# Patient Record
Sex: MALE | Race: WHITE | NOT HISPANIC OR LATINO | URBAN - METROPOLITAN AREA
[De-identification: names, ages, dates, MRNs, and addresses within clinical notes are randomized per-mention and may not be internally consistent; named-entity substitution may affect disease eponyms.]

---

## 2017-12-06 ENCOUNTER — EMERGENCY (EMERGENCY)
Facility: HOSPITAL | Age: 67
LOS: 1 days | Discharge: HOME | End: 2017-12-06
Attending: STUDENT IN AN ORGANIZED HEALTH CARE EDUCATION/TRAINING PROGRAM | Admitting: STUDENT IN AN ORGANIZED HEALTH CARE EDUCATION/TRAINING PROGRAM
Payer: COMMERCIAL

## 2017-12-06 VITALS
RESPIRATION RATE: 16 BRPM | TEMPERATURE: 97 F | HEART RATE: 77 BPM | SYSTOLIC BLOOD PRESSURE: 146 MMHG | HEIGHT: 65 IN | WEIGHT: 138.01 LBS | DIASTOLIC BLOOD PRESSURE: 77 MMHG | OXYGEN SATURATION: 98 %

## 2017-12-06 DIAGNOSIS — Z98.890 OTHER SPECIFIED POSTPROCEDURAL STATES: Chronic | ICD-10-CM

## 2017-12-06 PROCEDURE — 99284 EMERGENCY DEPT VISIT MOD MDM: CPT | Mod: 25

## 2017-12-06 RX ORDER — EZETIMIBE AND SIMVASTATIN 10; 80 MG/1; MG/1
1 TABLET, FILM COATED ORAL
Qty: 0 | Refills: 0 | COMMUNITY

## 2017-12-06 RX ORDER — DOXEPIN HCL 100 MG
1 CAPSULE ORAL
Qty: 0 | Refills: 0 | COMMUNITY

## 2017-12-06 RX ORDER — OMEPRAZOLE 10 MG/1
2 CAPSULE, DELAYED RELEASE ORAL
Qty: 0 | Refills: 0 | COMMUNITY

## 2017-12-06 RX ORDER — PRASUGREL 5 MG/1
0 TABLET, FILM COATED ORAL
Qty: 0 | Refills: 0 | COMMUNITY

## 2017-12-06 RX ORDER — TADALAFIL 10 MG/1
1 TABLET, FILM COATED ORAL
Qty: 0 | Refills: 0 | COMMUNITY

## 2017-12-06 RX ORDER — ASPIRIN/CALCIUM CARB/MAGNESIUM 324 MG
1 TABLET ORAL
Qty: 0 | Refills: 0 | COMMUNITY

## 2017-12-06 RX ORDER — DIAZEPAM 5 MG
1 TABLET ORAL
Qty: 0 | Refills: 0 | COMMUNITY

## 2017-12-06 NOTE — ED ADULT NURSE NOTE - OBJECTIVE STATEMENT
Received and assumed care of pt at this time aaox3 in no acute distress. pt is a 66 y/o m sent from urgent care to r/o testicular torsion, c/o testicular & groin pain x 2-3 hours. denies fever, chills, nausea, vomiting or any other distress. seen and evaluated by dr. butler. urine collected & sent, pending US, will continue to monitor.

## 2017-12-06 NOTE — ED ADULT TRIAGE NOTE - CHIEF COMPLAINT QUOTE
sent from urgent care for r/o testicular torsion..complaints of testicular and groin pain for a 2-3 hours, sudden onset. patient reports swelling and redness to testes

## 2017-12-07 VITALS
RESPIRATION RATE: 18 BRPM | TEMPERATURE: 98 F | SYSTOLIC BLOOD PRESSURE: 136 MMHG | OXYGEN SATURATION: 96 % | DIASTOLIC BLOOD PRESSURE: 72 MMHG | HEART RATE: 71 BPM

## 2017-12-07 LAB
APPEARANCE UR: CLEAR — SIGNIFICANT CHANGE UP
BILIRUB UR-MCNC: NEGATIVE — SIGNIFICANT CHANGE UP
COLOR SPEC: YELLOW — SIGNIFICANT CHANGE UP
DIFF PNL FLD: NEGATIVE — SIGNIFICANT CHANGE UP
GLUCOSE UR QL: NEGATIVE — SIGNIFICANT CHANGE UP
KETONES UR-MCNC: NEGATIVE — SIGNIFICANT CHANGE UP
LEUKOCYTE ESTERASE UR-ACNC: NEGATIVE — SIGNIFICANT CHANGE UP
NITRITE UR-MCNC: NEGATIVE — SIGNIFICANT CHANGE UP
PH UR: 5 — SIGNIFICANT CHANGE UP (ref 5–8)
PROT UR-MCNC: NEGATIVE — SIGNIFICANT CHANGE UP
SP GR SPEC: 1.02 — SIGNIFICANT CHANGE UP (ref 1.01–1.02)
UROBILINOGEN FLD QL: NEGATIVE — SIGNIFICANT CHANGE UP

## 2017-12-07 PROCEDURE — 76870 US EXAM SCROTUM: CPT | Mod: 26

## 2017-12-07 PROCEDURE — 87591 N.GONORRHOEAE DNA AMP PROB: CPT

## 2017-12-07 PROCEDURE — 87086 URINE CULTURE/COLONY COUNT: CPT

## 2017-12-07 PROCEDURE — 99284 EMERGENCY DEPT VISIT MOD MDM: CPT | Mod: 25

## 2017-12-07 PROCEDURE — 76870 US EXAM SCROTUM: CPT

## 2017-12-07 PROCEDURE — 81001 URINALYSIS AUTO W/SCOPE: CPT

## 2017-12-07 RX ADMIN — Medication 100 MILLIGRAM(S): at 01:43

## 2017-12-07 NOTE — ED PROVIDER NOTE - OBJECTIVE STATEMENT
67 year old male with a history of MI, CHF, HTN presents with sudden onset right testicular pain that occurred while having intercourse.  He went to  and was advised to come to ER to r/o testicular torsion.  Patient states he had similar pain in his left testicle this summer and was diagnosed with epididymitis.  Was placed on doxy and tested negative for GC/chlamydia.  States he cannot tolerate flouroquinolones.  PMD Tao Mercado

## 2017-12-08 LAB
CULTURE RESULTS: NO GROWTH — SIGNIFICANT CHANGE UP
N GONORRHOEA RRNA SPEC QL NAA+PROBE: SIGNIFICANT CHANGE UP
SPECIMEN SOURCE: SIGNIFICANT CHANGE UP
SPECIMEN SOURCE: SIGNIFICANT CHANGE UP

## 2021-09-17 PROBLEM — I21.9 ACUTE MYOCARDIAL INFARCTION, UNSPECIFIED: Chronic | Status: ACTIVE | Noted: 2017-12-06

## 2021-09-17 PROBLEM — Z95.5 PRESENCE OF CORONARY ANGIOPLASTY IMPLANT AND GRAFT: Chronic | Status: ACTIVE | Noted: 2017-12-06

## 2021-10-12 ENCOUNTER — APPOINTMENT (OUTPATIENT)
Dept: SURGERY | Facility: CLINIC | Age: 71
End: 2021-10-12
Payer: MEDICARE

## 2021-10-12 DIAGNOSIS — K40.91 UNILATERAL INGUINAL HERNIA, W/OUT OBSTRUCTION OR GANGRENE, RECURRENT: ICD-10-CM

## 2021-10-12 DIAGNOSIS — N50.3 CYST OF EPIDIDYMIS: ICD-10-CM

## 2021-10-12 PROCEDURE — 99205 OFFICE O/P NEW HI 60 MIN: CPT

## 2021-10-13 PROBLEM — N50.3 EPIDIDYMAL CYST: Status: ACTIVE | Noted: 2021-10-13

## 2021-10-13 PROBLEM — K40.91 RECURRENT LEFT INGUINAL HERNIA: Status: ACTIVE | Noted: 2021-10-13

## 2021-10-13 NOTE — ASSESSMENT
[FreeTextEntry1] : Presents with left groin pain.  Findings demonstrate recurrent left inguinal hernia.  Previous hernia repair by Dr. Shannon at [facility] many years ago.\par \par Also complains of left testicular pain.  Small focal nodule present at the apex of the testicle suspicious for epididymal cyst.  Recommend urologic follow-up.  Seen previously on testicular sono.\par \par Pt very inquisitive yet skeptical of what he's being told.  Anxious.\par \par Recommend a Laparoscopic  approach as this is is a recurrence and repeat anterior dissection can be difficult given previous scarring.  Risk, Benefits, and Alternatives to surgery have been discussed.  This includes but is not limited to bleeding, infection, damage to adjacent structures, need for additional surgery or interventions, adverse effects of anesthesia such as cardio-respiratory complications, prolonged intubation, cardiac arrhythmia, arrest, and or death.  Risks of forgoing surgery have also been discussed including progression of, and/or worsening of current condition which may then require urgent or emergent treatment or surgery.\par Educational material courtesy of the [employer] with respect to inguinal and femoral hernias has been provided for the patient's review and all questions have been answered.\par \par Recommend urologic follow up for left testicular pain and suspected epididymal cyst.\par \par The patient doesn’t seem convinced about what i've discussed with him and may not move forward with surgery at this time.\par \par Signs and symptoms of incarceration/strangulation/obstruction have been reviewed with the patient.  Should such symptoms present, urgent medical attention should be sought.  Contact my office immediately and or head to your nearest emergency room for evaluation and treatment.  This may require immediate surgical repair.\par

## 2021-10-13 NOTE — PHYSICAL EXAM
[Normal Breath Sounds] : Normal breath sounds [Normal Heart Sounds] : normal heart sounds [Normal Rate and Rhythm] : normal rate and rhythm [No Rash or Lesion] : No rash or lesion [Alert] : alert [Oriented to Person] : oriented to person [Oriented to Place] : oriented to place [Oriented to Time] : oriented to time [Anxious] : anxious [de-identified] : Elderly appearing gentleman appears anxious. [de-identified] : GIGI MURRAY EOMI [de-identified] : Soft, nontender nondistended, positive bowel sounds in all four quads.  Well healed and faint scars in bilateral groins.  Right side secondary to hernia repair as an infant.  Left groin from previous hernia repair several years ago.  Recurrent left inguinal hernia superior/medial.  Soft and easily reducible. No rebound or guarding.\par  [de-identified] : 2-3 mm well circumsribed nodule on left epididymis.  Slightly tender to palpation.  Corresponds to that which was previously seen on imaging. [de-identified] : Ambulating without difficulty or assistance

## 2021-11-29 ENCOUNTER — APPOINTMENT (OUTPATIENT)
Dept: SURGERY | Facility: HOSPITAL | Age: 71
End: 2021-11-29

## 2022-07-14 ENCOUNTER — APPOINTMENT (OUTPATIENT)
Dept: ULTRASOUND IMAGING | Facility: CLINIC | Age: 72
End: 2022-07-14

## 2022-07-14 PROCEDURE — 76700 US EXAM ABDOM COMPLETE: CPT

## 2022-07-14 PROCEDURE — 76857 US EXAM PELVIC LIMITED: CPT

## 2022-07-14 PROCEDURE — 76870 US EXAM SCROTUM: CPT

## 2022-07-19 ENCOUNTER — APPOINTMENT (OUTPATIENT)
Dept: ORTHOPEDIC SURGERY | Facility: CLINIC | Age: 72
End: 2022-07-19

## 2022-07-19 VITALS — BODY MASS INDEX: 22.49 KG/M2 | WEIGHT: 135 LBS | HEIGHT: 65 IN

## 2022-07-19 DIAGNOSIS — S60.022A CONTUSION OF LEFT INDEX FINGER W/OUT DAMAGE TO NAIL, INITIAL ENCOUNTER: ICD-10-CM

## 2022-07-19 PROCEDURE — 73140 X-RAY EXAM OF FINGER(S): CPT | Mod: LT

## 2022-07-19 PROCEDURE — 99213 OFFICE O/P EST LOW 20 MIN: CPT

## 2022-07-19 NOTE — ASSESSMENT
[FreeTextEntry1] : The patient was advised of the diagnosis. The natural history of the pathology was explained in full to the patient in layman's terms. All questions were answered. The risks and benefits of surgical and non-surgical treatment alternatives were explained in full to the patient.\par \par NSAIDs recommended.  Patient warned of risk of NSAID medication to stomach and GI tract, risk of increase blood pressure, cardiac risk, and risk of fluid retention.  The patient should clear taking medication with internist/PMD if any problem with heart, blood pressure, or GI system exists. \par \par ice

## 2022-07-19 NOTE — IMAGING
[de-identified] : left IF- no deformity- +swelling dip\par TTP about the DIP\par good flexion and extension\par NVID [Left] : left fingers [FreeTextEntry9] : left index finger - no fractures dislocations

## 2022-11-10 ENCOUNTER — OUTPATIENT (OUTPATIENT)
Dept: OUTPATIENT SERVICES | Facility: HOSPITAL | Age: 72
LOS: 1 days | End: 2022-11-10
Payer: MEDICARE

## 2022-11-10 DIAGNOSIS — B57.5: ICD-10-CM

## 2022-11-10 DIAGNOSIS — Z98.890 OTHER SPECIFIED POSTPROCEDURAL STATES: Chronic | ICD-10-CM
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
## 2022-11-10 DIAGNOSIS — R10.0 ACUTE ABDOMEN: ICD-10-CM
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
## 2022-11-11 PROCEDURE — 87207 SMEAR SPECIAL STAIN: CPT

## 2022-11-11 PROCEDURE — 36415 COLL VENOUS BLD VENIPUNCTURE: CPT

## 2022-11-11 PROCEDURE — 86753 PROTOZOA ANTIBODY NOS: CPT

## 2023-02-01 ENCOUNTER — APPOINTMENT (OUTPATIENT)
Dept: ULTRASOUND IMAGING | Facility: CLINIC | Age: 73
End: 2023-02-01
Payer: MEDICARE

## 2023-02-01 PROCEDURE — 93971 EXTREMITY STUDY: CPT | Mod: RT

## 2023-03-02 ENCOUNTER — APPOINTMENT (OUTPATIENT)
Dept: ORTHOPEDIC SURGERY | Facility: CLINIC | Age: 73
End: 2023-03-02
Payer: MEDICARE

## 2023-03-02 VITALS — BODY MASS INDEX: 22.49 KG/M2 | HEIGHT: 65 IN | WEIGHT: 135 LBS

## 2023-03-02 DIAGNOSIS — M79.18 MYALGIA, OTHER SITE: ICD-10-CM

## 2023-03-02 PROCEDURE — 99214 OFFICE O/P EST MOD 30 MIN: CPT | Mod: 25

## 2023-03-02 PROCEDURE — 20610 DRAIN/INJ JOINT/BURSA W/O US: CPT

## 2023-03-02 PROCEDURE — J3490M: CUSTOM | Mod: NC

## 2023-03-02 PROCEDURE — 73564 X-RAY EXAM KNEE 4 OR MORE: CPT | Mod: RT

## 2023-03-02 NOTE — IMAGING

## 2023-03-02 NOTE — HISTORY OF PRESENT ILLNESS
[de-identified] : 72 year old male  (retired)  chronic rt knee pain worsening since since 2/27/23 with no known injury. \par The pain is located  anterior\par The pain is associated with aching, popping, loose. \par Worse with activity and better at rest.\par Has tried ibuprofen, cream

## 2023-03-02 NOTE — ASSESSMENT
[FreeTextEntry1] : Right X-Ray Examination of the KNEE (4 views): medial and patellofemoral degenerate changes.\par \par chronic arthritis with exac along with prob mm root tearing\par \par - We discussed their diagnosis and treatment options at length including the risks and benefits of both surgical treatment with a knee replacement and non-surgical options.\par - We will continue conservative treatment with activity modification, PT, icing, weight loss, and anti-inflammatory medications.\par - The patient was provided with a PT prescription to work on ROM, hip ER/abductors strengthening, quad/hamstring stretches and strengthening, and other exercises \par - The patient was advised to let pain guide the gradual advancement of activities. \par - We also discussed the possible of a corticosteroid injection in order to help decrease inflammation and pain so that they can perform better therapy.\par - The risks, benefits, and alternatives to corticosteroid injection were reviewed with the patient and they wished to proceed with this treatment course. \par - Follow up as needed in 6 weeks to re-evaluate, if no improvement we spoke about possibility of viscosupplementation injections\par

## 2023-03-03 ENCOUNTER — APPOINTMENT (OUTPATIENT)
Dept: ORTHOPEDIC SURGERY | Facility: CLINIC | Age: 73
End: 2023-03-03
Payer: MEDICARE

## 2023-03-03 VITALS — WEIGHT: 135 LBS | HEIGHT: 65 IN | BODY MASS INDEX: 22.49 KG/M2

## 2023-03-03 DIAGNOSIS — M17.11 UNILATERAL PRIMARY OSTEOARTHRITIS, RIGHT KNEE: ICD-10-CM

## 2023-03-03 PROCEDURE — 99213 OFFICE O/P EST LOW 20 MIN: CPT

## 2023-03-03 NOTE — HISTORY OF PRESENT ILLNESS
[7] : 7 [4] : 4 [Dull/Aching] : dull/aching [Intermittent] : intermittent [Nothing helps with pain getting better] : Nothing helps with pain getting better [de-identified] : 72 year old male  (retired)  chronic rt knee pain worsening since since 2/27/23 with no known injury. \par The pain is located  anterior\par The pain is associated with aching, popping, loose. \par Worse with activity and better at rest.\par Has tried ibuprofen, cream [] : no

## 2023-03-03 NOTE — DISCUSSION/SUMMARY
[de-identified] : I explained to him the cortisone takes 3-10 days to start to work. Ice, ibuprofen, ROM and quad exercises, can start PT

## 2023-03-07 ENCOUNTER — OFFICE (OUTPATIENT)
Dept: URBAN - METROPOLITAN AREA CLINIC 109 | Facility: CLINIC | Age: 73
Setting detail: OPHTHALMOLOGY
End: 2023-03-07
Payer: MEDICARE

## 2023-03-07 DIAGNOSIS — H10.45: ICD-10-CM

## 2023-03-07 PROCEDURE — 99213 OFFICE O/P EST LOW 20 MIN: CPT | Performed by: OPHTHALMOLOGY

## 2023-03-07 ASSESSMENT — LID POSITION - DERMATOCHALASIS
OS_DERMATOCHALASIS: 1+
OD_DERMATOCHALASIS: 1+

## 2023-03-07 ASSESSMENT — REFRACTION_CURRENTRX
OS_ADD: +2.50
OS_AXIS: 173
OS_OVR_VA: 20/
OS_AXIS: 166
OS_OVR_VA: 20/
OD_VPRISM_DIRECTION: PROGS
OS_SPHERE: -8.00
OS_CYLINDER: -1.50
OD_SPHERE: -8.25
OS_SPHERE: -8.50
OS_CYLINDER: -2.00
OD_VPRISM_DIRECTION: PROGS
OD_ADD: +3.00
OS_CYLINDER: -1.25
OS_ADD: +3.00
OD_SPHERE: -7.00
OD_AXIS: 012
OD_OVR_VA: 20/
OS_ADD: +2.75
OD_CYLINDER: -1.00
OD_OVR_VA: 20/
OD_ADD: +2.50
OS_AXIS: 170
OD_CYLINDER: -0.25
OD_SPHERE: -8.00
OS_OVR_VA: 20/
OD_CYLINDER: -1.75
OD_OVR_VA: 20/
OD_AXIS: 10
OS_SPHERE: -7.25
OD_AXIS: 17
OD_ADD: +2.75
OS_VPRISM_DIRECTION: PROGS

## 2023-03-07 ASSESSMENT — REFRACTION_MANIFEST
OS_CYLINDER: -1.50
OS_ADD: +2.75
OD_AXIS: 15
OS_SPHERE: -6.75
OD_CYLINDER: -0.25
OD_SPHERE: -7.25
OD_VA1: 20/20-
OS_AXIS: 170
OS_VA1: 20/20-
OD_ADD: +2.75

## 2023-03-07 ASSESSMENT — SPHEQUIV_DERIVED
OS_SPHEQUIV: -7.5
OS_SPHEQUIV: -7.25
OD_SPHEQUIV: -7.375

## 2023-03-07 ASSESSMENT — KERATOMETRY
OD_AXISANGLE_DEGREES: 095
OS_AXISANGLE_DEGREES: 093
OD_K1POWER_DIOPTERS: 45.75
OS_K2POWER_DIOPTERS: 46.75
OD_K2POWER_DIOPTERS: 46.50
OS_K1POWER_DIOPTERS: 45.75

## 2023-03-07 ASSESSMENT — LID EXAM ASSESSMENTS
OS_BLEPHARITIS: LLL LUL 1+
OD_BLEPHARITIS: RLL RUL 1+

## 2023-03-07 ASSESSMENT — REFRACTION_AUTOREFRACTION
OS_CYLINDER: -1.50
OS_SPHERE: -6.50
OS_AXIS: 176

## 2023-03-07 ASSESSMENT — AXIALLENGTH_DERIVED
OS_AL: 25.5576
OD_AL: 25.6692
OS_AL: 25.6725

## 2023-03-07 ASSESSMENT — VISUAL ACUITY
OD_BCVA: 20/30
OS_BCVA: 20/25+2

## 2023-03-07 ASSESSMENT — CONFRONTATIONAL VISUAL FIELD TEST (CVF)
OD_FINDINGS: FULL
OS_FINDINGS: FULL

## 2023-03-07 ASSESSMENT — TONOMETRY: OD_IOP_MMHG: 16

## 2023-03-18 ENCOUNTER — OFFICE (OUTPATIENT)
Dept: URBAN - METROPOLITAN AREA CLINIC 109 | Facility: CLINIC | Age: 73
Setting detail: OPHTHALMOLOGY
End: 2023-03-18
Payer: MEDICARE

## 2023-03-18 DIAGNOSIS — H16.223: ICD-10-CM

## 2023-03-18 DIAGNOSIS — H02.89: ICD-10-CM

## 2023-03-18 DIAGNOSIS — H40.013: ICD-10-CM

## 2023-03-18 DIAGNOSIS — H10.45: ICD-10-CM

## 2023-03-18 PROCEDURE — 99213 OFFICE O/P EST LOW 20 MIN: CPT | Performed by: OPHTHALMOLOGY

## 2023-03-18 ASSESSMENT — SUPERFICIAL PUNCTATE KERATITIS (SPK)
OD_SPK: 1+
OS_SPK: 1+

## 2023-03-18 ASSESSMENT — REFRACTION_CURRENTRX
OS_CYLINDER: -2.00
OS_ADD: +2.75
OD_ADD: +2.75
OD_CYLINDER: -1.75
OD_AXIS: 012
OS_SPHERE: -8.50
OD_SPHERE: -8.00
OD_OVR_VA: 20/
OS_VPRISM_DIRECTION: PROGS
OD_VPRISM_DIRECTION: PROGS
OD_ADD: +3.00
OD_ADD: +2.50
OD_SPHERE: -8.25
OS_ADD: +2.50
OD_CYLINDER: -0.25
OS_OVR_VA: 20/
OD_OVR_VA: 20/
OS_OVR_VA: 20/
OD_VPRISM_DIRECTION: PROGS
OS_ADD: +3.00
OD_OVR_VA: 20/
OD_CYLINDER: -1.00
OD_AXIS: 10
OS_OVR_VA: 20/
OS_SPHERE: -8.00
OD_AXIS: 17
OS_CYLINDER: -1.50
OS_SPHERE: -7.25
OS_CYLINDER: -1.25
OS_AXIS: 173
OS_AXIS: 166
OD_SPHERE: -7.00
OS_AXIS: 170

## 2023-03-18 ASSESSMENT — VISUAL ACUITY
OD_BCVA: 20/20-1
OS_BCVA: 20/25+2

## 2023-03-18 ASSESSMENT — REFRACTION_AUTOREFRACTION
OS_AXIS: 176
OS_SPHERE: -6.50
OS_CYLINDER: -1.50

## 2023-03-18 ASSESSMENT — REFRACTION_MANIFEST
OD_ADD: +2.75
OD_SPHERE: -7.25
OS_SPHERE: -6.75
OS_VA1: 20/20-
OD_AXIS: 15
OD_CYLINDER: -0.25
OS_AXIS: 170
OS_ADD: +2.75
OD_VA1: 20/20-
OS_CYLINDER: -1.50

## 2023-03-18 ASSESSMENT — CONFRONTATIONAL VISUAL FIELD TEST (CVF)
OS_FINDINGS: FULL
OD_FINDINGS: FULL

## 2023-03-18 ASSESSMENT — LID EXAM ASSESSMENTS
OD_BLEPHARITIS: RLL RUL 1+
OS_BLEPHARITIS: LLL LUL 1+

## 2023-03-18 ASSESSMENT — LID POSITION - DERMATOCHALASIS
OD_DERMATOCHALASIS: 1+
OS_DERMATOCHALASIS: 1+

## 2023-03-18 ASSESSMENT — KERATOMETRY
OS_K2POWER_DIOPTERS: 46.75
OD_K2POWER_DIOPTERS: 46.50
OD_AXISANGLE_DEGREES: 095
OS_AXISANGLE_DEGREES: 093
OS_K1POWER_DIOPTERS: 45.75
OD_K1POWER_DIOPTERS: 45.75

## 2023-03-18 ASSESSMENT — AXIALLENGTH_DERIVED
OD_AL: 25.6692
OS_AL: 25.6725
OS_AL: 25.5576

## 2023-03-18 ASSESSMENT — SPHEQUIV_DERIVED
OS_SPHEQUIV: -7.5
OS_SPHEQUIV: -7.25
OD_SPHEQUIV: -7.375

## 2023-03-22 ENCOUNTER — OFFICE (OUTPATIENT)
Dept: URBAN - METROPOLITAN AREA CLINIC 109 | Facility: CLINIC | Age: 73
Setting detail: OPHTHALMOLOGY
End: 2023-03-22
Payer: MEDICARE

## 2023-03-22 DIAGNOSIS — H01.004: ICD-10-CM

## 2023-03-22 DIAGNOSIS — H40.013: ICD-10-CM

## 2023-03-22 DIAGNOSIS — H02.89: ICD-10-CM

## 2023-03-22 DIAGNOSIS — H01.005: ICD-10-CM

## 2023-03-22 DIAGNOSIS — H01.002: ICD-10-CM

## 2023-03-22 DIAGNOSIS — H01.001: ICD-10-CM

## 2023-03-22 PROCEDURE — 99213 OFFICE O/P EST LOW 20 MIN: CPT | Performed by: OPHTHALMOLOGY

## 2023-03-22 ASSESSMENT — REFRACTION_CURRENTRX
OS_CYLINDER: -1.25
OD_CYLINDER: -1.75
OD_AXIS: 17
OS_ADD: +2.75
OD_ADD: +2.50
OD_SPHERE: -7.00
OD_OVR_VA: 20/
OD_CYLINDER: -0.25
OS_OVR_VA: 20/
OS_OVR_VA: 20/
OS_AXIS: 166
OD_VPRISM_DIRECTION: PROGS
OS_SPHERE: -7.25
OD_AXIS: 10
OD_OVR_VA: 20/
OD_SPHERE: -8.00
OS_SPHERE: -8.00
OS_AXIS: 170
OD_OVR_VA: 20/
OD_SPHERE: -8.25
OS_CYLINDER: -1.50
OD_VPRISM_DIRECTION: PROGS
OD_ADD: +3.00
OD_CYLINDER: -1.00
OS_CYLINDER: -2.00
OS_ADD: +3.00
OS_ADD: +2.50
OS_OVR_VA: 20/
OS_VPRISM_DIRECTION: PROGS
OD_ADD: +2.75
OD_AXIS: 012
OS_AXIS: 173
OS_SPHERE: -8.50

## 2023-03-22 ASSESSMENT — REFRACTION_AUTOREFRACTION
OS_CYLINDER: -1.50
OS_AXIS: 176
OS_SPHERE: -6.50

## 2023-03-22 ASSESSMENT — SPHEQUIV_DERIVED
OS_SPHEQUIV: -7.5
OS_SPHEQUIV: -7.25
OD_SPHEQUIV: -7.375

## 2023-03-22 ASSESSMENT — REFRACTION_MANIFEST
OS_AXIS: 170
OD_ADD: +2.75
OD_VA1: 20/20-
OD_AXIS: 15
OD_SPHERE: -7.25
OS_VA1: 20/20-
OS_ADD: +2.75
OS_SPHERE: -6.75
OS_CYLINDER: -1.50
OD_CYLINDER: -0.25

## 2023-03-22 ASSESSMENT — AXIALLENGTH_DERIVED
OS_AL: 25.6725
OD_AL: 25.6692
OS_AL: 25.5576

## 2023-03-22 ASSESSMENT — SUPERFICIAL PUNCTATE KERATITIS (SPK)
OS_SPK: 1+
OD_SPK: 1+

## 2023-03-22 ASSESSMENT — VISUAL ACUITY
OS_BCVA: 20/20-1
OD_BCVA: 20/25+2

## 2023-03-22 ASSESSMENT — LID EXAM ASSESSMENTS
OS_BLEPHARITIS: LLL LUL 1+
OD_BLEPHARITIS: RLL RUL 1+

## 2023-03-22 ASSESSMENT — LID POSITION - DERMATOCHALASIS
OD_DERMATOCHALASIS: 1+
OS_DERMATOCHALASIS: 1+

## 2023-03-22 ASSESSMENT — KERATOMETRY
OS_K1POWER_DIOPTERS: 45.75
OD_K2POWER_DIOPTERS: 46.50
OD_K1POWER_DIOPTERS: 45.75
OS_K2POWER_DIOPTERS: 46.75
OS_AXISANGLE_DEGREES: 093
OD_AXISANGLE_DEGREES: 095

## 2023-03-22 ASSESSMENT — CONFRONTATIONAL VISUAL FIELD TEST (CVF)
OS_FINDINGS: FULL
OD_FINDINGS: FULL

## 2023-03-22 ASSESSMENT — TONOMETRY
OS_IOP_MMHG: 18
OD_IOP_MMHG: 18

## 2023-03-24 ENCOUNTER — APPOINTMENT (OUTPATIENT)
Dept: ORTHOPEDIC SURGERY | Facility: CLINIC | Age: 73
End: 2023-03-24
Payer: MEDICARE

## 2023-03-24 VITALS — HEIGHT: 65 IN | BODY MASS INDEX: 22.49 KG/M2 | WEIGHT: 135 LBS

## 2023-03-24 PROCEDURE — 99213 OFFICE O/P EST LOW 20 MIN: CPT | Mod: 25

## 2023-03-24 PROCEDURE — 20610 DRAIN/INJ JOINT/BURSA W/O US: CPT | Mod: LT

## 2023-03-24 PROCEDURE — 73030 X-RAY EXAM OF SHOULDER: CPT | Mod: LT

## 2023-03-24 NOTE — REASON FOR VISIT
[FreeTextEntry2] : 3/24/23- Right knee feeling mostly improved. Getting pain left shoulder reginald with certain overhead motions\par 3/3/23- Seen yesterday by Dr. Castillo, given CSI, still has c/o knee soreness

## 2023-03-24 NOTE — HISTORY OF PRESENT ILLNESS
[7] : 7 [4] : 4 [Dull/Aching] : dull/aching [Intermittent] : intermittent [Nothing helps with pain getting better] : Nothing helps with pain getting better [de-identified] : 72 year old male  (retired)  chronic rt knee pain worsening since since 2/27/23 with no known injury. \par The pain is located  anterior\par The pain is associated with aching, popping, loose. \par Worse with activity and better at rest.\par Has tried ibuprofen, cream [] : no

## 2023-03-24 NOTE — PHYSICAL EXAM
[Left] : left shoulder [There are no fractures, subluxations or dislocations. No significant abnormalities are seen] : There are no fractures, subluxations or dislocations. No significant abnormalities are seen [Right] : right knee [NL (0)] : extension 0 degrees [5___] : hamstring 5[unfilled]/5 [] : mildly antalgic [TWNoteComboBox7] : flexion 125 degrees

## 2023-03-24 NOTE — PROCEDURE
[Large Joint Injection] : Large joint injection [Left] : of the left [Subacromial Space] : subacromial space [Pain] : pain [Alcohol] : alcohol [Betadine] : betadine [Ethyl Chloride sprayed topically] : ethyl chloride sprayed topically [Sterile technique used] : sterile technique used [___ cc    6mg] :  Betamethasone (Celestone) ~Vcc of 6mg [___ cc    1%] : Lidocaine ~Vcc of 1%

## 2023-04-08 ENCOUNTER — RX ONLY (RX ONLY)
Age: 73
End: 2023-04-08

## 2023-04-08 ENCOUNTER — OFFICE (OUTPATIENT)
Dept: URBAN - METROPOLITAN AREA CLINIC 35 | Facility: CLINIC | Age: 73
Setting detail: OPHTHALMOLOGY
End: 2023-04-08
Payer: MEDICARE

## 2023-04-08 DIAGNOSIS — T15.11XD: ICD-10-CM

## 2023-04-08 DIAGNOSIS — H01.005: ICD-10-CM

## 2023-04-08 DIAGNOSIS — H01.004: ICD-10-CM

## 2023-04-08 DIAGNOSIS — T15.12XS: ICD-10-CM

## 2023-04-08 DIAGNOSIS — H01.002: ICD-10-CM

## 2023-04-08 DIAGNOSIS — T15.12XA: ICD-10-CM

## 2023-04-08 DIAGNOSIS — H02.89: ICD-10-CM

## 2023-04-08 DIAGNOSIS — T15.11XA: ICD-10-CM

## 2023-04-08 DIAGNOSIS — T15.11XS: ICD-10-CM

## 2023-04-08 DIAGNOSIS — T15.12XD: ICD-10-CM

## 2023-04-08 DIAGNOSIS — H01.001: ICD-10-CM

## 2023-04-08 PROBLEM — H16.223 DRY EYE SYNDROME K SICCA; BOTH EYES: Status: ACTIVE | Noted: 2023-03-18

## 2023-04-08 PROCEDURE — 99213 OFFICE O/P EST LOW 20 MIN: CPT | Performed by: OPHTHALMOLOGY

## 2023-04-08 ASSESSMENT — KERATOMETRY
OS_K2POWER_DIOPTERS: 46.75
OD_K2POWER_DIOPTERS: 46.50
OS_K1POWER_DIOPTERS: 45.75
OD_AXISANGLE_DEGREES: 095
OD_K1POWER_DIOPTERS: 45.75
OS_AXISANGLE_DEGREES: 093

## 2023-04-08 ASSESSMENT — REFRACTION_MANIFEST
OD_SPHERE: -7.25
OD_CYLINDER: -0.25
OD_AXIS: 15
OD_ADD: +2.75
OS_VA1: 20/20-
OS_SPHERE: -6.75
OS_CYLINDER: -1.50
OS_AXIS: 170
OD_VA1: 20/20-
OS_ADD: +2.75

## 2023-04-08 ASSESSMENT — REFRACTION_CURRENTRX
OD_OVR_VA: 20/
OD_ADD: +2.75
OS_CYLINDER: -1.50
OD_SPHERE: -7.00
OD_SPHERE: -8.00
OS_VPRISM_DIRECTION: PROGS
OS_AXIS: 0
OD_OVR_VA: 20/
OD_SPHERE: -8.00
OS_ADD: +2.75
OS_OVR_VA: 20/
OS_SPHERE: -8.25
OS_CYLINDER: -1.25
OS_ADD: +2.75
OD_CYLINDER: -0.25
OS_ADD: +2.50
OS_CYLINDER: -1.25
OD_VPRISM_DIRECTION: PROGS
OD_AXIS: 012
OS_AXIS: 170
OS_SPHERE: -7.25
OS_VPRISM_DIRECTION: PROGS
OD_AXIS: 024
OD_CYLINDER: -0.75
OD_OVR_VA: 20/
OS_AXIS: 166
OD_VPRISM_DIRECTION: PROGS
OD_ADD: +2.75
OS_SPHERE: -8.00
OS_OVR_VA: 20/
OD_CYLINDER: -1.00
OS_OVR_VA: 20/
OD_VPRISM_DIRECTION: PROGS
OD_ADD: +2.50
OD_AXIS: 10

## 2023-04-08 ASSESSMENT — SUPERFICIAL PUNCTATE KERATITIS (SPK)
OS_SPK: 1+
OD_SPK: 1+

## 2023-04-08 ASSESSMENT — REFRACTION_AUTOREFRACTION
OS_CYLINDER: -1.50
OS_SPHERE: -6.50
OS_AXIS: 176

## 2023-04-08 ASSESSMENT — SPHEQUIV_DERIVED
OD_SPHEQUIV: -7.375
OS_SPHEQUIV: -7.25
OS_SPHEQUIV: -7.5

## 2023-04-08 ASSESSMENT — LID POSITION - DERMATOCHALASIS
OS_DERMATOCHALASIS: 1+
OD_DERMATOCHALASIS: 1+

## 2023-04-08 ASSESSMENT — LID EXAM ASSESSMENTS
OS_MEIBOMITIS: LLL LUL 1+
OS_BLEPHARITIS: LLL LUL 1+
OD_MEIBOMITIS: RLL RUL 1+
OD_BLEPHARITIS: RLL RUL 1+

## 2023-04-08 ASSESSMENT — AXIALLENGTH_DERIVED
OD_AL: 25.6692
OS_AL: 25.6725
OS_AL: 25.5576

## 2023-04-08 ASSESSMENT — VISUAL ACUITY
OS_BCVA: 20/40-1+2
OD_BCVA: 20/50

## 2023-04-08 ASSESSMENT — CONFRONTATIONAL VISUAL FIELD TEST (CVF)
OD_FINDINGS: FULL
OS_FINDINGS: FULL

## 2023-04-24 ENCOUNTER — OFFICE (OUTPATIENT)
Dept: URBAN - METROPOLITAN AREA CLINIC 109 | Facility: CLINIC | Age: 73
Setting detail: OPHTHALMOLOGY
End: 2023-04-24
Payer: MEDICARE

## 2023-04-24 DIAGNOSIS — H01.005: ICD-10-CM

## 2023-04-24 DIAGNOSIS — H01.001: ICD-10-CM

## 2023-04-24 DIAGNOSIS — H01.004: ICD-10-CM

## 2023-04-24 DIAGNOSIS — H01.002: ICD-10-CM

## 2023-04-24 PROBLEM — T15.12XD FOREIGN BODY, CONJUNCTIVAL: Status: RESOLVED | Noted: 2023-04-08 | Resolved: 2023-04-24

## 2023-04-24 PROBLEM — T15.11XS FOREIGN BODY, CONJUNCTIVAL: Status: RESOLVED | Noted: 2023-04-08 | Resolved: 2023-04-24

## 2023-04-24 PROBLEM — H02.831 DERMATOCHALASIS; RIGHT UPPER LID, RIGHT LOWER LID, LEFT UPPER LID, LEFT LOWER LID: Status: ACTIVE | Noted: 2023-04-24

## 2023-04-24 PROBLEM — H02.832 DERMATOCHALASIS; RIGHT UPPER LID, RIGHT LOWER LID, LEFT UPPER LID, LEFT LOWER LID: Status: ACTIVE | Noted: 2023-04-24

## 2023-04-24 PROBLEM — T15.11XA FOREIGN BODY, CONJUNCTIVAL: Status: RESOLVED | Noted: 2023-04-08 | Resolved: 2023-04-24

## 2023-04-24 PROBLEM — H02.834 DERMATOCHALASIS; RIGHT UPPER LID, RIGHT LOWER LID, LEFT UPPER LID, LEFT LOWER LID: Status: ACTIVE | Noted: 2023-04-24

## 2023-04-24 PROBLEM — T15.12XA FOREIGN BODY, CONJUNCTIVAL: Status: RESOLVED | Noted: 2023-04-08 | Resolved: 2023-04-24

## 2023-04-24 PROBLEM — T15.11XD FOREIGN BODY, CONJUNCTIVAL: Status: RESOLVED | Noted: 2023-04-08 | Resolved: 2023-04-24

## 2023-04-24 PROBLEM — H02.835 DERMATOCHALASIS; RIGHT UPPER LID, RIGHT LOWER LID, LEFT UPPER LID, LEFT LOWER LID: Status: ACTIVE | Noted: 2023-04-24

## 2023-04-24 PROBLEM — T15.12XS FOREIGN BODY, CONJUNCTIVAL: Status: RESOLVED | Noted: 2023-04-08 | Resolved: 2023-04-24

## 2023-04-24 PROCEDURE — 99213 OFFICE O/P EST LOW 20 MIN: CPT | Performed by: OPHTHALMOLOGY

## 2023-04-24 ASSESSMENT — REFRACTION_CURRENTRX
OS_VPRISM_DIRECTION: PROGS
OS_AXIS: 170
OD_VPRISM_DIRECTION: PROGS
OD_OVR_VA: 20/
OD_CYLINDER: -0.25
OS_SPHERE: -8.25
OS_AXIS: 166
OD_SPHERE: -8.00
OS_CYLINDER: -1.25
OD_CYLINDER: -1.00
OS_CYLINDER: -1.50
OD_OVR_VA: 20/
OS_SPHERE: -8.00
OD_SPHERE: -8.00
OS_VPRISM_DIRECTION: PROGS
OD_AXIS: 024
OS_SPHERE: -7.25
OD_SPHERE: -7.00
OD_ADD: +2.75
OS_ADD: +2.50
OD_CYLINDER: -0.75
OD_AXIS: 012
OD_VPRISM_DIRECTION: PROGS
OS_OVR_VA: 20/
OS_OVR_VA: 20/
OS_CYLINDER: -1.25
OD_ADD: +2.75
OD_VPRISM_DIRECTION: PROGS
OS_ADD: +2.75
OD_OVR_VA: 20/
OD_AXIS: 10
OS_AXIS: 0
OD_ADD: +2.50
OS_OVR_VA: 20/
OS_ADD: +2.75

## 2023-04-24 ASSESSMENT — LID EXAM ASSESSMENTS
OS_BLEPHARITIS: LLL LUL 1+
OD_BLEPHARITIS: RLL RUL 1+
OS_MEIBOMITIS: LLL LUL 1+
OD_MEIBOMITIS: RLL RUL 1+

## 2023-04-24 ASSESSMENT — AXIALLENGTH_DERIVED
OS_AL: 25.6725
OD_AL: 25.6692
OS_AL: 25.5576

## 2023-04-24 ASSESSMENT — CONFRONTATIONAL VISUAL FIELD TEST (CVF)
OS_FINDINGS: FULL
OD_FINDINGS: FULL

## 2023-04-24 ASSESSMENT — VISUAL ACUITY
OS_BCVA: 20/30+2
OD_BCVA: 20/40-2

## 2023-04-24 ASSESSMENT — KERATOMETRY
OD_K2POWER_DIOPTERS: 46.50
OS_K1POWER_DIOPTERS: 45.75
OD_AXISANGLE_DEGREES: 095
OS_AXISANGLE_DEGREES: 093
OS_K2POWER_DIOPTERS: 46.75
OD_K1POWER_DIOPTERS: 45.75

## 2023-04-24 ASSESSMENT — REFRACTION_MANIFEST
OS_CYLINDER: -1.50
OD_VA1: 20/20-
OS_AXIS: 170
OS_SPHERE: -6.75
OD_SPHERE: -7.25
OD_CYLINDER: -0.25
OS_VA1: 20/20-
OD_ADD: +2.75
OS_ADD: +2.75
OD_AXIS: 15

## 2023-04-24 ASSESSMENT — LID POSITION - DERMATOCHALASIS
OS_DERMATOCHALASIS: 1+
OD_DERMATOCHALASIS: 1+

## 2023-04-24 ASSESSMENT — SPHEQUIV_DERIVED
OS_SPHEQUIV: -7.5
OD_SPHEQUIV: -7.375
OS_SPHEQUIV: -7.25

## 2023-04-24 ASSESSMENT — REFRACTION_AUTOREFRACTION
OS_CYLINDER: -1.50
OS_AXIS: 176
OS_SPHERE: -6.50

## 2023-04-24 ASSESSMENT — TONOMETRY
OS_IOP_MMHG: 21
OD_IOP_MMHG: 21

## 2023-04-24 ASSESSMENT — SUPERFICIAL PUNCTATE KERATITIS (SPK)
OD_SPK: 1+
OS_SPK: 1+

## 2023-05-05 ENCOUNTER — APPOINTMENT (OUTPATIENT)
Dept: ORTHOPEDIC SURGERY | Facility: CLINIC | Age: 73
End: 2023-05-05
Payer: MEDICARE

## 2023-05-05 VITALS — WEIGHT: 135 LBS | BODY MASS INDEX: 22.49 KG/M2 | HEIGHT: 65 IN

## 2023-05-05 DIAGNOSIS — M25.551 PAIN IN RIGHT HIP: ICD-10-CM

## 2023-05-05 DIAGNOSIS — M51.36 OTHER INTERVERTEBRAL DISC DEGENERATION, LUMBAR REGION: ICD-10-CM

## 2023-05-05 DIAGNOSIS — M54.50 LOW BACK PAIN, UNSPECIFIED: ICD-10-CM

## 2023-05-05 PROCEDURE — 72100 X-RAY EXAM L-S SPINE 2/3 VWS: CPT

## 2023-05-05 PROCEDURE — 73502 X-RAY EXAM HIP UNI 2-3 VIEWS: CPT

## 2023-05-05 PROCEDURE — 99214 OFFICE O/P EST MOD 30 MIN: CPT

## 2023-05-05 NOTE — PHYSICAL EXAM
[Disc space narrowing] : Disc space narrowing [Left] : left shoulder [AP] : anteroposterior [Lateral] : lateral [There are no fractures, subluxations or dislocations. No significant abnormalities are seen] : There are no fractures, subluxations or dislocations. No significant abnormalities are seen [Right] : right knee [NL (0)] : extension 0 degrees [5___] : hamstring 5[unfilled]/5 [FreeTextEntry1] : L3/4 ddd [] : no ecchymosis [TWNoteComboBox7] : flexion 125 degrees

## 2023-05-05 NOTE — REASON FOR VISIT
[FreeTextEntry2] : 5/5/23- No relief at all from left shoulder injection. Now c/o soreness low back and right hip\par 3/24/23- Right knee feeling mostly improved. Getting pain left shoulder reginald with certain overhead motions\par 3/3/23- Seen yesterday by Dr. Castillo, given CSI, still has c/o knee soreness

## 2023-05-05 NOTE — ASSESSMENT
[FreeTextEntry1] : Needs MRI left shoulder to r/o RCT\par Needs to see Rheumatologist for multiple somatic complaints

## 2023-05-05 NOTE — HISTORY OF PRESENT ILLNESS
[7] : 7 [4] : 4 [Dull/Aching] : dull/aching [Intermittent] : intermittent [Nothing helps with pain getting better] : Nothing helps with pain getting better [de-identified] : 72 year old male  (retired)  chronic rt knee pain worsening since since 2/27/23 with no known injury. \par The pain is located  anterior\par The pain is associated with aching, popping, loose. \par Worse with activity and better at rest.\par Has tried ibuprofen, cream [] : no [FreeTextEntry1] : left shoulder

## 2023-05-08 ENCOUNTER — RESULT REVIEW (OUTPATIENT)
Age: 73
End: 2023-05-08

## 2023-05-19 ENCOUNTER — APPOINTMENT (OUTPATIENT)
Dept: ORTHOPEDIC SURGERY | Facility: CLINIC | Age: 73
End: 2023-05-19
Payer: MEDICARE

## 2023-05-19 DIAGNOSIS — M18.0 BILATERAL PRIMARY OSTEOARTHRITIS OF FIRST CARPOMETACARPAL JOINTS: ICD-10-CM

## 2023-05-19 PROCEDURE — 73130 X-RAY EXAM OF HAND: CPT | Mod: 50

## 2023-05-19 PROCEDURE — 99214 OFFICE O/P EST MOD 30 MIN: CPT

## 2023-05-19 RX ORDER — METHYLPREDNISOLONE 4 MG/1
4 TABLET ORAL
Qty: 1 | Refills: 0 | Status: ACTIVE | COMMUNITY
Start: 2023-05-19 | End: 1900-01-01

## 2023-05-19 NOTE — HISTORY OF PRESENT ILLNESS
[7] : 7 [4] : 4 [Dull/Aching] : dull/aching [Intermittent] : intermittent [Nothing helps with pain getting better] : Nothing helps with pain getting better [de-identified] :  5/5/23- No relief at all from left shoulder injection. Now c/o soreness low back and right hip\par 3/24/23- Right knee feeling mostly improved. Getting pain left shoulder reginald with certain overhead motions\par 3/3/23- Seen yesterday by Dr. Castillo, given CSI, still has c/o knee soreness\par \par 72 year old male  (retired)  chronic rt knee pain worsening since since 2/27/23 with no known injury. \par The pain is located  anterior\par The pain is associated with aching, popping, loose. \par Worse with activity and better at rest.\par Has tried ibuprofen, cream [] : no [FreeTextEntry1] : left shoulder

## 2023-05-19 NOTE — REASON FOR VISIT
[FreeTextEntry2] : 5/19/23- c/o pain both hands by thumbs. No catching or locking\par Had MRI left shoulder: mild OA, effusion, rotator cuff tendinopathy without full thickness tear.

## 2023-05-19 NOTE — ASSESSMENT
[FreeTextEntry1] : No surgery necessary on left shoulder, would suggest Medrol does pack, then course of PT. Should see Rheumatologist as has multiple somatic complaints

## 2023-05-20 ENCOUNTER — OFFICE (OUTPATIENT)
Dept: URBAN - METROPOLITAN AREA CLINIC 109 | Facility: CLINIC | Age: 73
Setting detail: OPHTHALMOLOGY
End: 2023-05-20
Payer: MEDICARE

## 2023-05-20 DIAGNOSIS — H40.013: ICD-10-CM

## 2023-05-20 PROCEDURE — 92133 CPTRZD OPH DX IMG PST SGM ON: CPT | Performed by: OPHTHALMOLOGY

## 2023-05-20 PROCEDURE — 99213 OFFICE O/P EST LOW 20 MIN: CPT | Performed by: OPHTHALMOLOGY

## 2023-05-20 ASSESSMENT — REFRACTION_CURRENTRX
OS_AXIS: 0
OS_ADD: +2.75
OS_CYLINDER: -1.25
OD_VPRISM_DIRECTION: PROGS
OD_AXIS: 10
OS_SPHERE: -8.25
OD_ADD: +2.75
OS_ADD: +2.50
OS_AXIS: 166
OD_ADD: +2.75
OS_CYLINDER: -1.25
OS_VPRISM_DIRECTION: PROGS
OS_CYLINDER: -1.50
OS_OVR_VA: 20/
OD_OVR_VA: 20/
OD_SPHERE: -8.00
OD_CYLINDER: -0.75
OS_OVR_VA: 20/
OD_AXIS: 012
OD_VPRISM_DIRECTION: PROGS
OS_VPRISM_DIRECTION: PROGS
OD_VPRISM_DIRECTION: PROGS
OD_AXIS: 024
OD_OVR_VA: 20/
OD_CYLINDER: -0.25
OD_SPHERE: -7.00
OS_OVR_VA: 20/
OD_CYLINDER: -1.00
OS_ADD: +2.75
OS_SPHERE: -8.00
OD_OVR_VA: 20/
OD_ADD: +2.50
OS_SPHERE: -7.25
OS_AXIS: 170
OD_SPHERE: -8.00

## 2023-05-20 ASSESSMENT — KERATOMETRY
OD_AXISANGLE_DEGREES: 095
OS_AXISANGLE_DEGREES: 093
OS_K2POWER_DIOPTERS: 46.75
OS_K1POWER_DIOPTERS: 45.75
OD_K2POWER_DIOPTERS: 46.50
OD_K1POWER_DIOPTERS: 45.75

## 2023-05-20 ASSESSMENT — LID EXAM ASSESSMENTS
OS_MEIBOMITIS: LLL LUL 1+
OD_BLEPHARITIS: RLL RUL 1+
OS_BLEPHARITIS: LLL LUL 1+
OD_MEIBOMITIS: RLL RUL 1+

## 2023-05-20 ASSESSMENT — VISUAL ACUITY
OS_BCVA: 20/20
OD_BCVA: 20/30-1

## 2023-05-20 ASSESSMENT — SPHEQUIV_DERIVED
OS_SPHEQUIV: -7.25
OD_SPHEQUIV: -7.375
OS_SPHEQUIV: -7.5

## 2023-05-20 ASSESSMENT — REFRACTION_AUTOREFRACTION
OS_AXIS: 176
OS_CYLINDER: -1.50
OS_SPHERE: -6.50

## 2023-05-20 ASSESSMENT — REFRACTION_MANIFEST
OD_AXIS: 15
OD_VA1: 20/20-
OS_SPHERE: -6.75
OD_CYLINDER: -0.25
OD_ADD: +2.75
OD_SPHERE: -7.25
OS_ADD: +2.75
OS_VA1: 20/20-
OS_CYLINDER: -1.50
OS_AXIS: 170

## 2023-05-20 ASSESSMENT — LID POSITION - DERMATOCHALASIS
OD_DERMATOCHALASIS: 1+
OS_DERMATOCHALASIS: 1+

## 2023-05-20 ASSESSMENT — SUPERFICIAL PUNCTATE KERATITIS (SPK)
OD_SPK: 1+
OS_SPK: 1+

## 2023-05-20 ASSESSMENT — CONFRONTATIONAL VISUAL FIELD TEST (CVF)
OS_FINDINGS: FULL
OD_FINDINGS: FULL

## 2023-05-20 ASSESSMENT — AXIALLENGTH_DERIVED
OS_AL: 25.6725
OD_AL: 25.6692
OS_AL: 25.5576

## 2023-06-20 ENCOUNTER — OFFICE (OUTPATIENT)
Dept: URBAN - METROPOLITAN AREA CLINIC 109 | Facility: CLINIC | Age: 73
Setting detail: OPHTHALMOLOGY
End: 2023-06-20
Payer: MEDICARE

## 2023-06-20 DIAGNOSIS — H02.89: ICD-10-CM

## 2023-06-20 DIAGNOSIS — H10.45: ICD-10-CM

## 2023-06-20 DIAGNOSIS — H40.013: ICD-10-CM

## 2023-06-20 PROCEDURE — 92020 GONIOSCOPY: CPT | Performed by: OPHTHALMOLOGY

## 2023-06-20 PROCEDURE — 99213 OFFICE O/P EST LOW 20 MIN: CPT | Performed by: OPHTHALMOLOGY

## 2023-06-20 ASSESSMENT — LID POSITION - DERMATOCHALASIS
OS_DERMATOCHALASIS: 1+
OD_DERMATOCHALASIS: 1+

## 2023-06-20 ASSESSMENT — CONFRONTATIONAL VISUAL FIELD TEST (CVF)
OD_FINDINGS: FULL
OS_FINDINGS: FULL

## 2023-06-20 ASSESSMENT — SUPERFICIAL PUNCTATE KERATITIS (SPK)
OS_SPK: 1+
OD_SPK: 1+

## 2023-06-20 ASSESSMENT — LID EXAM ASSESSMENTS
OS_MEIBOMITIS: LLL LUL 1+
OD_BLEPHARITIS: RLL RUL 1+
OD_MEIBOMITIS: RLL RUL 1+
OS_BLEPHARITIS: LLL LUL 1+

## 2023-06-20 ASSESSMENT — TONOMETRY
OS_IOP_MMHG: 20
OD_IOP_MMHG: 21

## 2023-06-21 ASSESSMENT — SPHEQUIV_DERIVED
OS_SPHEQUIV: -7.5
OD_SPHEQUIV: -7.375
OS_SPHEQUIV: -7.25

## 2023-06-21 ASSESSMENT — REFRACTION_CURRENTRX
OD_ADD: +2.75
OD_AXIS: 024
OD_ADD: +2.50
OD_CYLINDER: -1.00
OS_AXIS: 170
OD_SPHERE: -8.00
OS_CYLINDER: -1.25
OD_SPHERE: -8.00
OS_AXIS: 0
OD_OVR_VA: 20/
OD_AXIS: 10
OS_AXIS: 166
OS_CYLINDER: -1.25
OD_OVR_VA: 20/
OS_VPRISM_DIRECTION: PROGS
OD_VPRISM_DIRECTION: PROGS
OD_SPHERE: -7.00
OD_VPRISM_DIRECTION: PROGS
OD_OVR_VA: 20/
OS_SPHERE: -8.00
OS_OVR_VA: 20/
OS_ADD: +2.75
OD_ADD: +2.75
OS_VPRISM_DIRECTION: PROGS
OS_ADD: +2.50
OS_OVR_VA: 20/
OS_SPHERE: -8.25
OD_CYLINDER: -0.25
OS_ADD: +2.75
OD_VPRISM_DIRECTION: PROGS
OS_SPHERE: -7.25
OD_AXIS: 012
OS_CYLINDER: -1.50
OS_OVR_VA: 20/
OD_CYLINDER: -0.75

## 2023-06-21 ASSESSMENT — VISUAL ACUITY
OD_BCVA: 20/20-2
OS_BCVA: 20/25-1

## 2023-06-21 ASSESSMENT — REFRACTION_MANIFEST
OS_AXIS: 170
OD_SPHERE: -7.25
OS_ADD: +2.75
OD_VA1: 20/20-
OD_AXIS: 15
OS_VA1: 20/20-
OS_SPHERE: -6.75
OD_ADD: +2.75
OD_CYLINDER: -0.25
OS_CYLINDER: -1.50

## 2023-06-21 ASSESSMENT — AXIALLENGTH_DERIVED
OS_AL: 25.5576
OS_AL: 25.6725
OD_AL: 25.6692

## 2023-06-21 ASSESSMENT — KERATOMETRY
OD_K1POWER_DIOPTERS: 45.75
OS_AXISANGLE_DEGREES: 093
OD_AXISANGLE_DEGREES: 095
OS_K1POWER_DIOPTERS: 45.75
OD_K2POWER_DIOPTERS: 46.50
OS_K2POWER_DIOPTERS: 46.75

## 2023-06-21 ASSESSMENT — REFRACTION_AUTOREFRACTION
OS_SPHERE: -6.50
OS_AXIS: 176
OS_CYLINDER: -1.50

## 2023-06-30 ENCOUNTER — APPOINTMENT (OUTPATIENT)
Dept: ORTHOPEDIC SURGERY | Facility: CLINIC | Age: 73
End: 2023-06-30
Payer: MEDICARE

## 2023-06-30 VITALS — BODY MASS INDEX: 22.49 KG/M2 | WEIGHT: 135 LBS | HEIGHT: 65 IN

## 2023-06-30 DIAGNOSIS — M19.012 PRIMARY OSTEOARTHRITIS, LEFT SHOULDER: ICD-10-CM

## 2023-06-30 PROCEDURE — 99213 OFFICE O/P EST LOW 20 MIN: CPT

## 2023-06-30 NOTE — PHYSICAL EXAM
[Disc space narrowing] : Disc space narrowing [Left] : left shoulder [1st] : 1st [CMC Joint] : CMC joint [Bilateral] : hand bilaterally [Degenerative change] : Degenerative change [FreeTextEntry1] : moderate basilar thumb OA [AP] : anteroposterior [Lateral] : lateral [There are no fractures, subluxations or dislocations. No significant abnormalities are seen] : There are no fractures, subluxations or dislocations. No significant abnormalities are seen [Right] : right knee [NL (0)] : extension 0 degrees [5___] : hamstring 5[unfilled]/5 [] : mildly antalgic [TWNoteComboBox7] : flexion 125 degrees

## 2023-06-30 NOTE — REASON FOR VISIT
[FreeTextEntry2] : 6/30/23- Left shoulder pain unchanged\par 5/19/23- c/o pain both hands by thumbs. No catching or locking\par Had MRI left shoulder: mild OA, effusion, rotator cuff tendinopathy without full thickness tear.

## 2023-06-30 NOTE — HISTORY OF PRESENT ILLNESS
[Dull/Aching] : dull/aching [Intermittent] : intermittent [de-identified] :  5/5/23- No relief at all from left shoulder injection. Now c/o soreness low back and right hip\par 3/24/23- Right knee feeling mostly improved. Getting pain left shoulder reginald with certain overhead motions\par 3/3/23- Seen yesterday by Dr. Castillo, given CSI, still has c/o knee soreness\par \par 72 year old male  (retired)  chronic rt knee pain worsening since since 2/27/23 with no known injury. \par The pain is located  anterior\par The pain is associated with aching, popping, loose. \par Worse with activity and better at rest.\par Has tried ibuprofen, cream [7] : 7 [4] : 4 [Nothing helps with pain getting better] : Nothing helps with pain getting better [] : yes [FreeTextEntry1] : left shoulder

## 2023-08-05 ENCOUNTER — OFFICE (OUTPATIENT)
Dept: URBAN - METROPOLITAN AREA CLINIC 109 | Facility: CLINIC | Age: 73
Setting detail: OPHTHALMOLOGY
End: 2023-08-05
Payer: MEDICARE

## 2023-08-05 DIAGNOSIS — H00.022: ICD-10-CM

## 2023-08-05 PROCEDURE — 99213 OFFICE O/P EST LOW 20 MIN: CPT | Performed by: OPHTHALMOLOGY

## 2023-08-05 ASSESSMENT — SPHEQUIV_DERIVED
OS_SPHEQUIV: -7.5
OS_SPHEQUIV: -7.25
OD_SPHEQUIV: -7.375

## 2023-08-05 ASSESSMENT — REFRACTION_CURRENTRX
OS_VPRISM_DIRECTION: PROGS
OS_SPHERE: -8.25
OD_SPHERE: -7.00
OS_ADD: +2.75
OS_OVR_VA: 20/
OD_SPHERE: -8.00
OD_ADD: +2.50
OS_CYLINDER: -1.25
OD_VPRISM_DIRECTION: PROGS
OS_OVR_VA: 20/
OD_VPRISM_DIRECTION: PROGS
OS_VPRISM_DIRECTION: PROGS
OD_CYLINDER: -0.25
OD_OVR_VA: 20/
OD_CYLINDER: -1.00
OD_OVR_VA: 20/
OS_CYLINDER: -1.50
OS_ADD: +2.50
OD_ADD: +2.75
OD_AXIS: 024
OS_AXIS: 170
OS_CYLINDER: -1.25
OD_AXIS: 10
OS_SPHERE: -7.25
OD_ADD: +2.75
OD_SPHERE: -8.00
OD_CYLINDER: -0.75
OS_SPHERE: -8.00
OD_OVR_VA: 20/
OD_AXIS: 012
OD_VPRISM_DIRECTION: PROGS
OS_ADD: +2.75
OS_AXIS: 166
OS_AXIS: 0
OS_OVR_VA: 20/

## 2023-08-05 ASSESSMENT — KERATOMETRY
OS_AXISANGLE_DEGREES: 093
OS_K2POWER_DIOPTERS: 46.75
OD_K1POWER_DIOPTERS: 45.75
OD_K2POWER_DIOPTERS: 46.50
OS_K1POWER_DIOPTERS: 45.75
OD_AXISANGLE_DEGREES: 095

## 2023-08-05 ASSESSMENT — LID EXAM ASSESSMENTS
OD_MEIBOMITIS: RLL RUL 1+
OS_BLEPHARITIS: LLL LUL 1+
OD_BLEPHARITIS: RLL RUL 1+
OS_MEIBOMITIS: LLL LUL 1+

## 2023-08-05 ASSESSMENT — REFRACTION_MANIFEST
OS_CYLINDER: -1.50
OS_VA1: 20/20-
OS_ADD: +2.75
OS_SPHERE: -6.75
OD_AXIS: 15
OD_SPHERE: -7.25
OD_ADD: +2.75
OD_VA1: 20/20-
OS_AXIS: 170
OD_CYLINDER: -0.25

## 2023-08-05 ASSESSMENT — CONFRONTATIONAL VISUAL FIELD TEST (CVF)
OD_FINDINGS: FULL
OS_FINDINGS: FULL

## 2023-08-05 ASSESSMENT — SUPERFICIAL PUNCTATE KERATITIS (SPK)
OS_SPK: 1+
OD_SPK: 1+

## 2023-08-05 ASSESSMENT — AXIALLENGTH_DERIVED
OD_AL: 25.6692
OS_AL: 25.6725
OS_AL: 25.5576

## 2023-08-05 ASSESSMENT — REFRACTION_AUTOREFRACTION
OS_AXIS: 176
OS_SPHERE: -6.50
OS_CYLINDER: -1.50

## 2023-08-05 ASSESSMENT — VISUAL ACUITY
OD_BCVA: 20/30
OS_BCVA: 20/20-2

## 2023-08-05 ASSESSMENT — LID POSITION - DERMATOCHALASIS
OS_DERMATOCHALASIS: 1+
OD_DERMATOCHALASIS: 1+

## 2023-08-11 ENCOUNTER — APPOINTMENT (OUTPATIENT)
Dept: ORTHOPEDIC SURGERY | Facility: CLINIC | Age: 73
End: 2023-08-11
Payer: MEDICARE

## 2023-08-11 VITALS — WEIGHT: 135 LBS | BODY MASS INDEX: 22.49 KG/M2 | HEIGHT: 65 IN

## 2023-08-11 DIAGNOSIS — M75.42 IMPINGEMENT SYNDROME OF LEFT SHOULDER: ICD-10-CM

## 2023-08-11 PROCEDURE — 99213 OFFICE O/P EST LOW 20 MIN: CPT

## 2023-08-11 NOTE — PHYSICAL EXAM
[Disc space narrowing] : Disc space narrowing [Left] : left shoulder [1st] : 1st [CMC Joint] : CMC joint [Bilateral] : hand bilaterally [Degenerative change] : Degenerative change [AP] : anteroposterior [Lateral] : lateral [There are no fractures, subluxations or dislocations. No significant abnormalities are seen] : There are no fractures, subluxations or dislocations. No significant abnormalities are seen [Right] : right knee [NL (0)] : extension 0 degrees [5___] : hamstring 5[unfilled]/5 [FreeTextEntry1] : moderate basilar thumb OA [] : no erythema [TWNoteComboBox7] : flexion 125 degrees

## 2023-08-11 NOTE — HISTORY OF PRESENT ILLNESS
[7] : 7 [4] : 4 [Dull/Aching] : dull/aching [Intermittent] : intermittent [Nothing helps with pain getting better] : Nothing helps with pain getting better [de-identified] :  5/5/23- No relief at all from left shoulder injection. Now c/o soreness low back and right hip\par  3/24/23- Right knee feeling mostly improved. Getting pain left shoulder reginald with certain overhead motions\par  3/3/23- Seen yesterday by Dr. Castillo, given CSI, still has c/o knee soreness\par  \par  72 year old male  (retired)  chronic rt knee pain worsening since since 2/27/23 with no known injury. \par  The pain is located  anterior\par  The pain is associated with aching, popping, loose. \par  Worse with activity and better at rest.\par  Has tried ibuprofen, cream [] : no [FreeTextEntry1] : left shoulder

## 2023-08-11 NOTE — REASON FOR VISIT
[FreeTextEntry2] : 8/11/23- Overall pain not too bad in left shoulder, did not do any PT 6/30/23- Left shoulder pain unchanged 5/19/23- c/o pain both hands by thumbs. No catching or locking Had MRI left shoulder: mild OA, effusion, rotator cuff tendinopathy without full thickness tear.

## 2023-08-20 ENCOUNTER — OFFICE (OUTPATIENT)
Dept: URBAN - METROPOLITAN AREA CLINIC 12 | Facility: CLINIC | Age: 73
Setting detail: OPHTHALMOLOGY
End: 2023-08-20
Payer: MEDICARE

## 2023-08-20 DIAGNOSIS — H10.89: ICD-10-CM

## 2023-08-20 PROCEDURE — 99213 OFFICE O/P EST LOW 20 MIN: CPT | Performed by: OPTOMETRIST

## 2023-08-20 ASSESSMENT — REFRACTION_CURRENTRX
OD_SPHERE: -8.00
OS_AXIS: 0
OS_AXIS: 166
OD_ADD: +2.50
OD_SPHERE: -7.00
OD_ADD: +2.75
OD_AXIS: 10
OD_CYLINDER: -0.25
OS_AXIS: 170
OS_SPHERE: -8.25
OS_CYLINDER: -1.50
OS_ADD: +2.75
OD_OVR_VA: 20/
OD_ADD: +2.75
OD_VPRISM_DIRECTION: PROGS
OS_OVR_VA: 20/
OS_SPHERE: -7.25
OS_SPHERE: -8.00
OS_VPRISM_DIRECTION: PROGS
OS_ADD: +2.50
OS_OVR_VA: 20/
OD_CYLINDER: -0.75
OD_CYLINDER: -1.00
OS_VPRISM_DIRECTION: PROGS
OD_SPHERE: -8.00
OS_CYLINDER: -1.25
OD_AXIS: 012
OD_VPRISM_DIRECTION: PROGS
OD_AXIS: 024
OD_VPRISM_DIRECTION: PROGS
OS_ADD: +2.75
OS_CYLINDER: -1.25
OS_OVR_VA: 20/
OD_OVR_VA: 20/
OD_OVR_VA: 20/

## 2023-08-20 ASSESSMENT — REFRACTION_MANIFEST
OD_AXIS: 15
OS_ADD: +2.75
OD_ADD: +2.75
OD_CYLINDER: -0.25
OS_VA1: 20/20-
OS_AXIS: 170
OS_CYLINDER: -1.50
OD_SPHERE: -7.25
OD_VA1: 20/20-
OS_SPHERE: -6.75

## 2023-08-20 ASSESSMENT — LID EXAM ASSESSMENTS
OD_MEIBOMITIS: RLL RUL 1+
OS_MEIBOMITIS: LLL LUL 1+
OS_BLEPHARITIS: LLL LUL 1+
OD_BLEPHARITIS: RLL RUL 1+

## 2023-08-20 ASSESSMENT — SUPERFICIAL PUNCTATE KERATITIS (SPK)
OD_SPK: 1+
OS_SPK: 1+

## 2023-08-20 ASSESSMENT — SPHEQUIV_DERIVED
OS_SPHEQUIV: -7.5
OD_SPHEQUIV: -7.375

## 2023-08-20 ASSESSMENT — CONFRONTATIONAL VISUAL FIELD TEST (CVF)
OD_FINDINGS: FULL
OS_FINDINGS: FULL

## 2023-08-20 ASSESSMENT — LID POSITION - DERMATOCHALASIS
OS_DERMATOCHALASIS: 1+
OD_DERMATOCHALASIS: 1+

## 2023-08-20 ASSESSMENT — VISUAL ACUITY
OS_BCVA: 20/20-2
OD_BCVA: 20/30+2

## 2023-08-24 ENCOUNTER — OFFICE (OUTPATIENT)
Dept: URBAN - METROPOLITAN AREA CLINIC 109 | Facility: CLINIC | Age: 73
Setting detail: OPHTHALMOLOGY
End: 2023-08-24
Payer: MEDICARE

## 2023-08-24 DIAGNOSIS — H01.002: ICD-10-CM

## 2023-08-24 DIAGNOSIS — H16.223: ICD-10-CM

## 2023-08-24 DIAGNOSIS — H01.004: ICD-10-CM

## 2023-08-24 DIAGNOSIS — H01.005: ICD-10-CM

## 2023-08-24 DIAGNOSIS — H01.001: ICD-10-CM

## 2023-08-24 DIAGNOSIS — H10.89: ICD-10-CM

## 2023-08-24 PROBLEM — H02.83 DERMATOCHALASIS; RIGHT UPPER LID, RIGHT LOWER LID, LEFT UPPER LID, LEFT LOWER LID: Status: ACTIVE | Noted: 2023-08-05

## 2023-08-24 PROBLEM — H00.022 HORDEOLUM INTERNUM ; RIGHT LOWER LID: Status: ACTIVE | Noted: 2023-08-05

## 2023-08-24 PROCEDURE — 99213 OFFICE O/P EST LOW 20 MIN: CPT | Performed by: OPHTHALMOLOGY

## 2023-08-24 ASSESSMENT — TONOMETRY
OS_IOP_MMHG: 18
OD_IOP_MMHG: 18

## 2023-08-24 ASSESSMENT — REFRACTION_CURRENTRX
OD_SPHERE: -8.00
OD_AXIS: 024
OS_CYLINDER: -1.50
OD_VPRISM_DIRECTION: PROGS
OS_SPHERE: -8.25
OS_ADD: +2.50
OD_ADD: +2.75
OD_SPHERE: -7.00
OS_OVR_VA: 20/
OS_SPHERE: -8.00
OS_CYLINDER: -1.25
OS_AXIS: 166
OD_OVR_VA: 20/
OD_VPRISM_DIRECTION: PROGS
OD_CYLINDER: -0.75
OD_CYLINDER: -1.00
OS_AXIS: 0
OS_AXIS: 170
OD_ADD: +2.50
OS_VPRISM_DIRECTION: PROGS
OD_OVR_VA: 20/
OS_SPHERE: -7.25
OS_VPRISM_DIRECTION: PROGS
OS_ADD: +2.75
OD_CYLINDER: -0.25
OD_VPRISM_DIRECTION: PROGS
OD_AXIS: 012
OS_OVR_VA: 20/
OS_ADD: +2.75
OD_ADD: +2.75
OS_OVR_VA: 20/
OD_SPHERE: -8.00
OD_AXIS: 10
OS_CYLINDER: -1.25
OD_OVR_VA: 20/

## 2023-08-24 ASSESSMENT — LID EXAM ASSESSMENTS
OS_BLEPHARITIS: LLL LUL 1+
OD_BLEPHARITIS: RLL RUL 1+
OD_MEIBOMITIS: RLL RUL 1+
OS_MEIBOMITIS: LLL LUL 1+

## 2023-08-24 ASSESSMENT — REFRACTION_MANIFEST
OD_VA1: 20/20-
OD_ADD: +2.75
OD_SPHERE: -7.25
OS_ADD: +2.75
OS_VA1: 20/20-
OS_AXIS: 170
OS_SPHERE: -6.75
OD_AXIS: 15
OD_CYLINDER: -0.25
OS_CYLINDER: -1.50

## 2023-08-24 ASSESSMENT — LID POSITION - DERMATOCHALASIS
OS_DERMATOCHALASIS: 1+
OD_DERMATOCHALASIS: 1+

## 2023-08-24 ASSESSMENT — SPHEQUIV_DERIVED
OS_SPHEQUIV: -7.5
OD_SPHEQUIV: -7.375

## 2023-08-24 ASSESSMENT — SUPERFICIAL PUNCTATE KERATITIS (SPK)
OD_SPK: 1+
OS_SPK: 1+

## 2023-08-24 ASSESSMENT — VISUAL ACUITY
OS_BCVA: 20/20-2
OD_BCVA: 20/30+2

## 2023-08-24 ASSESSMENT — CONFRONTATIONAL VISUAL FIELD TEST (CVF)
OD_FINDINGS: FULL
OS_FINDINGS: FULL

## 2023-09-19 ENCOUNTER — OFFICE (OUTPATIENT)
Dept: URBAN - METROPOLITAN AREA CLINIC 109 | Facility: CLINIC | Age: 73
Setting detail: OPHTHALMOLOGY
End: 2023-09-19
Payer: MEDICARE

## 2023-09-19 DIAGNOSIS — H02.89: ICD-10-CM

## 2023-09-19 DIAGNOSIS — H10.45: ICD-10-CM

## 2023-09-19 DIAGNOSIS — H16.223: ICD-10-CM

## 2023-09-19 PROCEDURE — 99213 OFFICE O/P EST LOW 20 MIN: CPT | Performed by: OPHTHALMOLOGY

## 2023-09-19 ASSESSMENT — REFRACTION_CURRENTRX
OS_ADD: +2.50
OD_SPHERE: -8.00
OD_ADD: +2.75
OS_VPRISM_DIRECTION: PROGS
OD_VPRISM_DIRECTION: PROGS
OD_ADD: +2.50
OS_SPHERE: -8.25
OD_SPHERE: -8.00
OD_ADD: +2.75
OD_CYLINDER: -0.75
OD_OVR_VA: 20/
OS_CYLINDER: -1.50
OD_AXIS: 012
OS_ADD: +2.75
OS_OVR_VA: 20/
OS_SPHERE: -8.00
OD_SPHERE: -7.00
OS_AXIS: 170
OS_OVR_VA: 20/
OD_VPRISM_DIRECTION: PROGS
OS_CYLINDER: -1.25
OD_AXIS: 024
OS_AXIS: 166
OS_CYLINDER: -1.25
OD_OVR_VA: 20/
OD_AXIS: 10
OS_OVR_VA: 20/
OD_CYLINDER: -1.00
OS_VPRISM_DIRECTION: PROGS
OS_SPHERE: -7.25
OS_AXIS: 0
OS_ADD: +2.75
OD_OVR_VA: 20/
OD_CYLINDER: -0.25
OD_VPRISM_DIRECTION: PROGS

## 2023-09-19 ASSESSMENT — LID EXAM ASSESSMENTS
OS_BLEPHARITIS: LLL LUL 1+
OD_MEIBOMITIS: RLL RUL 1+
OS_MEIBOMITIS: LLL LUL 1+
OD_BLEPHARITIS: RLL RUL 1+

## 2023-09-19 ASSESSMENT — TONOMETRY
OD_IOP_MMHG: 17
OS_IOP_MMHG: 17

## 2023-09-19 ASSESSMENT — REFRACTION_MANIFEST
OS_AXIS: 170
OD_AXIS: 15
OD_ADD: +2.75
OS_SPHERE: -6.75
OD_VA1: 20/20-
OS_ADD: +2.75
OS_CYLINDER: -1.50
OS_VA1: 20/20-
OD_SPHERE: -7.25
OD_CYLINDER: -0.25

## 2023-09-19 ASSESSMENT — LID POSITION - DERMATOCHALASIS
OS_DERMATOCHALASIS: 1+
OD_DERMATOCHALASIS: 1+

## 2023-09-19 ASSESSMENT — SPHEQUIV_DERIVED
OD_SPHEQUIV: -7.375
OS_SPHEQUIV: -7.5

## 2023-09-19 ASSESSMENT — CONFRONTATIONAL VISUAL FIELD TEST (CVF)
OS_FINDINGS: FULL
OD_FINDINGS: FULL

## 2023-09-19 ASSESSMENT — SUPERFICIAL PUNCTATE KERATITIS (SPK)
OS_SPK: 1+
OD_SPK: 1+

## 2023-09-19 ASSESSMENT — VISUAL ACUITY
OD_BCVA: 20/30+2
OS_BCVA: 20/30+2

## 2023-09-21 ENCOUNTER — RX ONLY (RX ONLY)
Age: 73
End: 2023-09-21

## 2023-09-21 ENCOUNTER — OFFICE (OUTPATIENT)
Dept: URBAN - METROPOLITAN AREA CLINIC 109 | Facility: CLINIC | Age: 73
Setting detail: OPHTHALMOLOGY
End: 2023-09-21
Payer: MEDICARE

## 2023-09-21 DIAGNOSIS — H10.45: ICD-10-CM

## 2023-09-21 PROCEDURE — 99212 OFFICE O/P EST SF 10 MIN: CPT | Performed by: OPHTHALMOLOGY

## 2023-09-21 ASSESSMENT — REFRACTION_MANIFEST
OD_ADD: +2.75
OD_CYLINDER: -0.25
OS_AXIS: 170
OD_VA1: 20/20-
OD_SPHERE: -7.25
OS_SPHERE: -6.75
OS_VA1: 20/20-
OS_ADD: +2.75
OS_CYLINDER: -1.50
OD_AXIS: 15

## 2023-09-21 ASSESSMENT — REFRACTION_CURRENTRX
OS_VPRISM_DIRECTION: PROGS
OD_SPHERE: -8.00
OS_AXIS: 0
OD_OVR_VA: 20/
OD_OVR_VA: 20/
OD_ADD: +2.50
OD_CYLINDER: -0.75
OS_ADD: +2.75
OD_CYLINDER: -1.00
OD_AXIS: 012
OD_VPRISM_DIRECTION: PROGS
OD_SPHERE: -7.00
OS_OVR_VA: 20/
OS_SPHERE: -8.00
OD_VPRISM_DIRECTION: PROGS
OD_VPRISM_DIRECTION: PROGS
OS_CYLINDER: -1.25
OD_OVR_VA: 20/
OS_SPHERE: -8.25
OS_AXIS: 170
OS_CYLINDER: -1.50
OD_SPHERE: -8.00
OS_ADD: +2.75
OD_ADD: +2.75
OD_ADD: +2.75
OD_AXIS: 10
OS_OVR_VA: 20/
OS_ADD: +2.50
OS_AXIS: 166
OS_VPRISM_DIRECTION: PROGS
OS_SPHERE: -7.25
OD_CYLINDER: -0.25
OD_AXIS: 024
OS_CYLINDER: -1.25
OS_OVR_VA: 20/

## 2023-09-21 ASSESSMENT — SUPERFICIAL PUNCTATE KERATITIS (SPK)
OS_SPK: 1+
OD_SPK: 1+

## 2023-09-21 ASSESSMENT — VISUAL ACUITY
OS_BCVA: 20/30+2
OD_BCVA: 20/30+2

## 2023-09-21 ASSESSMENT — LID POSITION - DERMATOCHALASIS
OD_DERMATOCHALASIS: 1+
OS_DERMATOCHALASIS: 1+

## 2023-09-21 ASSESSMENT — SPHEQUIV_DERIVED
OD_SPHEQUIV: -7.375
OS_SPHEQUIV: -7.5

## 2023-11-13 ENCOUNTER — OFFICE (OUTPATIENT)
Dept: URBAN - METROPOLITAN AREA CLINIC 109 | Facility: CLINIC | Age: 73
Setting detail: OPHTHALMOLOGY
End: 2023-11-13
Payer: MEDICARE

## 2023-11-13 DIAGNOSIS — H10.45: ICD-10-CM

## 2023-11-13 PROCEDURE — 99213 OFFICE O/P EST LOW 20 MIN: CPT | Performed by: OPHTHALMOLOGY

## 2023-11-13 ASSESSMENT — REFRACTION_MANIFEST
OD_VA1: 20/20-
OS_CYLINDER: -1.50
OD_CYLINDER: -0.25
OD_SPHERE: -7.25
OD_ADD: +2.75
OS_VA1: 20/20-
OD_AXIS: 15
OS_ADD: +2.75
OS_AXIS: 170
OS_SPHERE: -6.75

## 2023-11-13 ASSESSMENT — REFRACTION_CURRENTRX
OS_OVR_VA: 20/
OD_ADD: +2.75
OS_OVR_VA: 20/
OS_CYLINDER: -1.50
OS_ADD: +2.50
OD_VPRISM_DIRECTION: PROGS
OD_VPRISM_DIRECTION: PROGS
OS_SPHERE: -7.25
OD_SPHERE: -7.00
OS_VPRISM_DIRECTION: PROGS
OD_AXIS: 012
OS_ADD: +2.75
OD_ADD: +2.75
OS_ADD: +2.75
OD_AXIS: 10
OD_AXIS: 024
OD_OVR_VA: 20/
OS_OVR_VA: 20/
OD_SPHERE: -8.00
OS_AXIS: 166
OS_AXIS: 0
OD_SPHERE: -8.00
OS_CYLINDER: -1.25
OD_VPRISM_DIRECTION: PROGS
OS_VPRISM_DIRECTION: PROGS
OS_SPHERE: -8.25
OS_SPHERE: -8.00
OD_ADD: +2.50
OS_CYLINDER: -1.25
OD_CYLINDER: -0.25
OD_CYLINDER: -1.00
OD_OVR_VA: 20/
OD_OVR_VA: 20/
OS_AXIS: 170
OD_CYLINDER: -0.75

## 2023-11-13 ASSESSMENT — CONFRONTATIONAL VISUAL FIELD TEST (CVF)
OS_FINDINGS: FULL
OD_FINDINGS: FULL

## 2023-11-13 ASSESSMENT — LID EXAM ASSESSMENTS
OS_MEIBOMITIS: LLL LUL 1+
OD_MEIBOMITIS: RLL RUL 1+
OS_BLEPHARITIS: LLL LUL 1+
OD_BLEPHARITIS: RLL RUL 1+

## 2023-11-13 ASSESSMENT — LID POSITION - DERMATOCHALASIS
OS_DERMATOCHALASIS: 1+
OD_DERMATOCHALASIS: 1+

## 2023-11-13 ASSESSMENT — SUPERFICIAL PUNCTATE KERATITIS (SPK)
OS_SPK: 1+
OD_SPK: 1+

## 2023-11-13 ASSESSMENT — SPHEQUIV_DERIVED
OS_SPHEQUIV: -7.5
OD_SPHEQUIV: -7.375

## 2023-11-14 ENCOUNTER — OFFICE (OUTPATIENT)
Dept: URBAN - METROPOLITAN AREA CLINIC 109 | Facility: CLINIC | Age: 73
Setting detail: OPHTHALMOLOGY
End: 2023-11-14
Payer: MEDICARE

## 2023-11-14 DIAGNOSIS — H16.223: ICD-10-CM

## 2023-11-14 DIAGNOSIS — H01.002: ICD-10-CM

## 2023-11-14 DIAGNOSIS — H01.001: ICD-10-CM

## 2023-11-14 DIAGNOSIS — H01.004: ICD-10-CM

## 2023-11-14 DIAGNOSIS — H02.831: ICD-10-CM

## 2023-11-14 DIAGNOSIS — H02.89: ICD-10-CM

## 2023-11-14 DIAGNOSIS — G51.4: ICD-10-CM

## 2023-11-14 DIAGNOSIS — H01.005: ICD-10-CM

## 2023-11-14 DIAGNOSIS — H10.45: ICD-10-CM

## 2023-11-14 DIAGNOSIS — H43.813: ICD-10-CM

## 2023-11-14 DIAGNOSIS — H25.13: ICD-10-CM

## 2023-11-14 DIAGNOSIS — H43.393: ICD-10-CM

## 2023-11-14 PROCEDURE — 99213 OFFICE O/P EST LOW 20 MIN: CPT | Performed by: OPHTHALMOLOGY

## 2023-11-14 ASSESSMENT — REFRACTION_CURRENTRX
OD_VPRISM_DIRECTION: PROGS
OD_VPRISM_DIRECTION: PROGS
OD_CYLINDER: -0.25
OD_AXIS: 012
OS_VPRISM_DIRECTION: PROGS
OD_OVR_VA: 20/
OD_SPHERE: -8.00
OD_OVR_VA: 20/
OS_OVR_VA: 20/
OS_OVR_VA: 20/
OS_VPRISM_DIRECTION: PROGS
OS_ADD: +2.75
OS_AXIS: 170
OS_ADD: +2.50
OS_SPHERE: -8.00
OS_CYLINDER: -1.50
OD_AXIS: 10
OD_AXIS: 024
OS_AXIS: 166
OD_OVR_VA: 20/
OD_ADD: +2.75
OD_SPHERE: -7.00
OD_SPHERE: -8.00
OS_AXIS: 0
OS_SPHERE: -7.25
OD_ADD: +2.75
OD_ADD: +2.50
OD_CYLINDER: -0.75
OS_CYLINDER: -1.25
OS_SPHERE: -8.25
OS_CYLINDER: -1.25
OD_VPRISM_DIRECTION: PROGS
OS_OVR_VA: 20/
OD_CYLINDER: -1.00
OS_ADD: +2.75

## 2023-11-14 ASSESSMENT — REFRACTION_MANIFEST
OS_AXIS: 170
OD_ADD: +2.75
OD_CYLINDER: -0.25
OS_ADD: +2.75
OS_CYLINDER: -1.50
OD_VA1: 20/20-2
OD_AXIS: 15
OD_SPHERE: -7.25
OS_SPHERE: -6.75
OS_VA1: 20/20-2
OD_CYLINDER: -0.25
OD_AXIS: 015
OS_VA1: 20/20-
OD_SPHERE: -6.75
OS_AXIS: 170
OS_SPHERE: -6.75
OD_VA1: 20/20-
OS_CYLINDER: -1.50

## 2023-11-14 ASSESSMENT — CONFRONTATIONAL VISUAL FIELD TEST (CVF)
OD_FINDINGS: FULL
OS_FINDINGS: FULL

## 2023-11-14 ASSESSMENT — LID POSITION - DERMATOCHALASIS
OD_DERMATOCHALASIS: 1+
OS_DERMATOCHALASIS: 1+

## 2023-11-14 ASSESSMENT — LID EXAM ASSESSMENTS
OS_MEIBOMITIS: LLL LUL 1+
OD_MEIBOMITIS: RLL RUL 1+
OD_BLEPHARITIS: RLL RUL 1+
OS_BLEPHARITIS: LLL LUL 1+

## 2023-11-14 ASSESSMENT — SPHEQUIV_DERIVED
OD_SPHEQUIV: -6.875
OD_SPHEQUIV: -7.375
OS_SPHEQUIV: -7.5
OS_SPHEQUIV: -7.5

## 2023-11-14 ASSESSMENT — SUPERFICIAL PUNCTATE KERATITIS (SPK)
OD_SPK: 1+
OS_SPK: 1+

## 2023-11-27 ENCOUNTER — OFFICE (OUTPATIENT)
Dept: URBAN - METROPOLITAN AREA CLINIC 109 | Facility: CLINIC | Age: 73
Setting detail: OPHTHALMOLOGY
End: 2023-11-27
Payer: MEDICARE

## 2023-11-27 DIAGNOSIS — H10.232: ICD-10-CM

## 2023-11-27 PROBLEM — G51.4 MYOKIYMIA: Status: ACTIVE | Noted: 2023-11-14

## 2023-11-27 PROBLEM — H02.834 DERMATOCHALASIS; RIGHT UPPER LID, LEFT UPPER LID: Status: ACTIVE | Noted: 2023-11-14

## 2023-11-27 PROBLEM — H02.831 DERMATOCHALASIS; RIGHT UPPER LID, LEFT UPPER LID: Status: ACTIVE | Noted: 2023-11-14

## 2023-11-27 PROCEDURE — 99213 OFFICE O/P EST LOW 20 MIN: CPT | Performed by: OPHTHALMOLOGY

## 2023-11-27 ASSESSMENT — REFRACTION_CURRENTRX
OD_OVR_VA: 20/
OS_SPHERE: -8.25
OD_OVR_VA: 20/
OS_CYLINDER: -1.25
OD_ADD: +2.75
OS_AXIS: 170
OD_VPRISM_DIRECTION: PROGS
OS_ADD: +2.75
OS_AXIS: 0
OS_ADD: +2.50
OD_SPHERE: -8.00
OD_OVR_VA: 20/
OD_CYLINDER: -0.25
OS_VPRISM_DIRECTION: PROGS
OD_AXIS: 012
OS_OVR_VA: 20/
OS_OVR_VA: 20/
OD_VPRISM_DIRECTION: PROGS
OD_CYLINDER: -1.00
OS_VPRISM_DIRECTION: PROGS
OD_ADD: +2.50
OD_CYLINDER: -0.75
OD_SPHERE: -8.00
OS_SPHERE: -8.00
OS_SPHERE: -7.25
OS_CYLINDER: -1.50
OD_AXIS: 024
OD_AXIS: 10
OD_SPHERE: -7.00
OS_OVR_VA: 20/
OS_AXIS: 166
OD_VPRISM_DIRECTION: PROGS
OS_ADD: +2.75
OD_ADD: +2.75
OS_CYLINDER: -1.25

## 2023-11-27 ASSESSMENT — REFRACTION_MANIFEST
OS_SPHERE: -6.75
OS_CYLINDER: -1.50
OS_VA1: 20/20-
OD_ADD: +2.75
OD_SPHERE: -7.25
OD_SPHERE: -6.75
OS_SPHERE: -6.75
OS_AXIS: 170
OD_VA1: 20/20-2
OD_CYLINDER: -0.25
OS_CYLINDER: -1.50
OD_AXIS: 15
OD_CYLINDER: -0.25
OD_VA1: 20/20-
OS_VA1: 20/20-2
OS_ADD: +2.75
OS_AXIS: 170
OD_AXIS: 015

## 2023-11-27 ASSESSMENT — SUPERFICIAL PUNCTATE KERATITIS (SPK)
OS_SPK: 1+
OD_SPK: 1+

## 2023-11-27 ASSESSMENT — SPHEQUIV_DERIVED
OD_SPHEQUIV: -7.375
OS_SPHEQUIV: -7.5
OS_SPHEQUIV: -7.5
OD_SPHEQUIV: -6.875

## 2023-11-27 ASSESSMENT — LID POSITION - DERMATOCHALASIS
OS_DERMATOCHALASIS: 1+
OD_DERMATOCHALASIS: 1+

## 2024-01-12 ENCOUNTER — NON-APPOINTMENT (OUTPATIENT)
Age: 74
End: 2024-01-12

## 2024-01-12 ENCOUNTER — APPOINTMENT (OUTPATIENT)
Dept: OTOLARYNGOLOGY | Facility: CLINIC | Age: 74
End: 2024-01-12
Payer: MEDICARE

## 2024-01-12 ENCOUNTER — RESULT REVIEW (OUTPATIENT)
Age: 74
End: 2024-01-12

## 2024-01-12 VITALS
HEIGHT: 65 IN | BODY MASS INDEX: 21.66 KG/M2 | WEIGHT: 130 LBS | SYSTOLIC BLOOD PRESSURE: 136 MMHG | DIASTOLIC BLOOD PRESSURE: 68 MMHG | HEART RATE: 65 BPM

## 2024-01-12 DIAGNOSIS — J32.0 CHRONIC MAXILLARY SINUSITIS: ICD-10-CM

## 2024-01-12 DIAGNOSIS — G50.1 ATYPICAL FACIAL PAIN: ICD-10-CM

## 2024-01-12 DIAGNOSIS — J34.2 DEVIATED NASAL SEPTUM: ICD-10-CM

## 2024-01-12 DIAGNOSIS — I25.2 OLD MYOCARDIAL INFARCTION: ICD-10-CM

## 2024-01-12 DIAGNOSIS — J31.0 CHRONIC RHINITIS: ICD-10-CM

## 2024-01-12 PROCEDURE — 99204 OFFICE O/P NEW MOD 45 MIN: CPT | Mod: 25

## 2024-01-12 PROCEDURE — 31231 NASAL ENDOSCOPY DX: CPT

## 2024-01-12 RX ORDER — FLUTICASONE PROPIONATE 50 UG/1
50 POWDER, METERED RESPIRATORY (INHALATION)
Refills: 0 | Status: ACTIVE | COMMUNITY

## 2024-01-12 RX ORDER — IBUPROFEN 200 MG/1
CAPSULE, LIQUID FILLED ORAL
Refills: 0 | Status: ACTIVE | COMMUNITY

## 2024-01-12 RX ORDER — TAMSULOSIN HYDROCHLORIDE 0.4 MG/1
CAPSULE ORAL
Refills: 0 | Status: ACTIVE | COMMUNITY

## 2024-01-12 NOTE — CONSULT LETTER
[Dear  ___] : Dear  [unfilled], [Courtesy Letter:] : I had the pleasure of seeing your patient, [unfilled], in my office today. [Please see my note below.] : Please see my note below. [Referral Closing:] : Thank you very much for seeing this patient.  If you have any questions, please do not hesitate to contact me. [Sincerely,] : Sincerely, [FreeTextEntry3] : German Miller PA-C

## 2024-01-12 NOTE — PROCEDURE
[Recalcitrant Symptoms] : recalcitrant symptoms  [Anterior rhinoscopy insufficient to account for symptoms] : anterior rhinoscopy insufficient to account for symptoms [FreeTextEntry6] : Procedure:  Flexible Nasal Endoscopy: Risks, benefits, and alternatives of flexible endoscopy were explained to the patient. The patient gave oral consent to proceed.  The flexible scope was inserted into the right nasal cavity.  Endoscopy of the inferior and middle meatus was performed.  No polyp, mass, or lesion was appreciated.  Olfactory cleft was clear. Spheno-ethmoid recess is clear. Nasopharynx was clear.  Turbinates were without mass.   The procedure was repeated on the contralateral side with similar findings. deviated septum

## 2024-01-12 NOTE — REVIEW OF SYSTEMS
[Sneezing] : sneezing [Seasonal Allergies] : seasonal allergies [Post Nasal Drip] : post nasal drip [Ear Pain] : ear pain [Ear Itch] : ear itch [Nasal Congestion] : nasal congestion [Sinus Pain] : sinus pain [Sinus Pressure] : sinus pressure [Negative] : Heme/Lymph

## 2024-01-12 NOTE — PHYSICAL EXAM
[] : septum deviated to the left [Midline] : trachea located in midline position [Normal] : no rashes [Hearing Loss Right Only] : normal [Hearing Loss Left Only] : normal [de-identified] : inflamed turbinates [FreeTextEntry2] : mild tenderness nasolabial groove,

## 2024-01-12 NOTE — ASSESSMENT
[FreeTextEntry1] : Reviewed and reconciled medications, allergies, PMHx, PSHx, SocHx, FMHx.     physical exam: right ear: looks normal left ear: looks normal Inflamed turbinates deviated septum tender nasolabial groove teeth looks normal  Procedure:  Flexible Nasal Endoscopy: Risks, benefits, and alternatives of flexible endoscopy were explained to the patient. The patient gave oral consent to proceed.  The flexible scope was inserted into the right nasal cavity.  Endoscopy of the inferior and middle meatus was performed.  No polyp, mass, or lesion was appreciated.  Olfactory cleft was clear. Spheno-ethmoid recess is clear. Nasopharynx was clear.  Turbinates were without mass.   The procedure was repeated on the contralateral side with similar findings. deviated septum  minict 1/12/24: frontal: clean sphenoid: clean  maxillary: clean ethmoid:  clean deviated septum soft tissue looks normal as well.  no obvious pathology in the teeth   Plan: Will call when radiologist reviews  Recommended to go see what dentist thinks

## 2024-01-12 NOTE — HISTORY OF PRESENT ILLNESS
[de-identified] : Patient presents for 1 month had a uri. States that since than he has been having sinus pain and pressure. Patient states dentist is pretty close to the dentist.  Patient states he takes olopatidine for his allergies. Patient states on flonase, sudafed for his symptoms. States his symptoms worse in the night time.

## 2024-03-02 ENCOUNTER — OFFICE (OUTPATIENT)
Dept: URBAN - METROPOLITAN AREA CLINIC 109 | Facility: CLINIC | Age: 74
Setting detail: OPHTHALMOLOGY
End: 2024-03-02
Payer: MEDICARE

## 2024-03-02 DIAGNOSIS — H10.45: ICD-10-CM

## 2024-03-02 DIAGNOSIS — G51.4: ICD-10-CM

## 2024-03-02 DIAGNOSIS — H25.13: ICD-10-CM

## 2024-03-02 DIAGNOSIS — H16.223: ICD-10-CM

## 2024-03-02 PROCEDURE — 99213 OFFICE O/P EST LOW 20 MIN: CPT | Performed by: OPHTHALMOLOGY

## 2024-03-02 ASSESSMENT — REFRACTION_CURRENTRX
OS_ADD: +2.75
OD_AXIS: 012
OD_CYLINDER: -0.25
OS_ADD: +2.75
OD_OVR_VA: 20/
OS_SPHERE: -8.25
OD_CYLINDER: -0.75
OD_SPHERE: -8.00
OD_AXIS: 024
OD_CYLINDER: -1.00
OS_VPRISM_DIRECTION: PROGS
OD_OVR_VA: 20/
OD_ADD: +2.75
OS_AXIS: 0
OD_VPRISM_DIRECTION: PROGS
OD_SPHERE: -7.00
OD_SPHERE: -8.00
OS_AXIS: 166
OS_SPHERE: -7.25
OD_ADD: +2.50
OD_AXIS: 10
OS_AXIS: 170
OD_OVR_VA: 20/
OS_OVR_VA: 20/
OD_ADD: +2.75
OS_SPHERE: -8.00
OS_OVR_VA: 20/
OS_CYLINDER: -1.25
OS_CYLINDER: -1.25
OD_VPRISM_DIRECTION: PROGS
OD_VPRISM_DIRECTION: PROGS
OS_VPRISM_DIRECTION: PROGS
OS_OVR_VA: 20/
OS_ADD: +2.50
OS_CYLINDER: -1.50

## 2024-03-02 ASSESSMENT — REFRACTION_MANIFEST
OD_SPHERE: -7.25
OS_ADD: +2.75
OD_CYLINDER: -0.25
OS_AXIS: 170
OD_VA1: 20/20-
OD_AXIS: 015
OD_VA1: 20/20-2
OD_ADD: +2.75
OS_AXIS: 170
OD_CYLINDER: -0.25
OD_SPHERE: -6.75
OS_CYLINDER: -1.50
OS_SPHERE: -6.75
OS_VA1: 20/20-2
OS_CYLINDER: -1.50
OS_VA1: 20/20-
OD_AXIS: 15
OS_SPHERE: -6.75

## 2024-03-02 ASSESSMENT — SPHEQUIV_DERIVED
OD_SPHEQUIV: -6.875
OS_SPHEQUIV: -7.5
OS_SPHEQUIV: -7.5
OD_SPHEQUIV: -7.375

## 2024-03-02 ASSESSMENT — LID EXAM ASSESSMENTS
OD_BLEPHARITIS: RLL RUL 1+
OS_MEIBOMITIS: LLL LUL 1+
OD_MEIBOMITIS: RLL RUL 1+
OS_BLEPHARITIS: LLL LUL 1+

## 2024-03-02 ASSESSMENT — LID POSITION - DERMATOCHALASIS
OS_DERMATOCHALASIS: 1+
OD_DERMATOCHALASIS: 1+

## 2024-07-01 ENCOUNTER — OFFICE (OUTPATIENT)
Dept: URBAN - METROPOLITAN AREA CLINIC 109 | Facility: CLINIC | Age: 74
Setting detail: OPHTHALMOLOGY
End: 2024-07-01
Payer: MEDICARE

## 2024-07-01 DIAGNOSIS — H02.011: ICD-10-CM

## 2024-07-01 DIAGNOSIS — H16.223: ICD-10-CM

## 2024-07-01 PROCEDURE — 99213 OFFICE O/P EST LOW 20 MIN: CPT | Mod: 25 | Performed by: OPHTHALMOLOGY

## 2024-07-01 PROCEDURE — 67820 REVISE EYELASHES: CPT | Mod: RT | Performed by: OPHTHALMOLOGY

## 2024-07-01 ASSESSMENT — CONFRONTATIONAL VISUAL FIELD TEST (CVF)
OS_FINDINGS: FULL
OD_FINDINGS: FULL

## 2024-07-01 ASSESSMENT — LID EXAM ASSESSMENTS
OD_BLEPHARITIS: RLL RUL 1+
OS_BLEPHARITIS: LLL LUL 1+
OD_TRICHIASIS: RUL T 1+
OS_MEIBOMITIS: LLL LUL 1+
OD_MEIBOMITIS: RLL RUL 1+

## 2024-07-01 ASSESSMENT — LID POSITION - DERMATOCHALASIS
OD_DERMATOCHALASIS: 1+
OS_DERMATOCHALASIS: 1+

## 2024-07-18 ENCOUNTER — OFFICE (OUTPATIENT)
Dept: URBAN - METROPOLITAN AREA CLINIC 63 | Facility: CLINIC | Age: 74
Setting detail: OPHTHALMOLOGY
End: 2024-07-18
Payer: MEDICARE

## 2024-07-18 DIAGNOSIS — H16.223: ICD-10-CM

## 2024-07-18 DIAGNOSIS — H02.89: ICD-10-CM

## 2024-07-18 DIAGNOSIS — H02.011: ICD-10-CM

## 2024-07-18 PROCEDURE — 92012 INTRM OPH EXAM EST PATIENT: CPT | Performed by: STUDENT IN AN ORGANIZED HEALTH CARE EDUCATION/TRAINING PROGRAM

## 2024-07-18 ASSESSMENT — CONFRONTATIONAL VISUAL FIELD TEST (CVF)
OD_FINDINGS: FULL
OS_FINDINGS: FULL

## 2024-07-18 ASSESSMENT — LID EXAM ASSESSMENTS
OS_MEIBOMITIS: LLL LUL 1+
OS_BLEPHARITIS: LLL LUL 1+
OD_TRICHIASIS: RUL T 1+
OD_BLEPHARITIS: RLL RUL 1+
OD_MEIBOMITIS: RLL RUL 1+

## 2024-07-18 ASSESSMENT — LID POSITION - DERMATOCHALASIS
OD_DERMATOCHALASIS: 1+
OS_DERMATOCHALASIS: 1+

## 2024-07-22 ENCOUNTER — OFFICE (OUTPATIENT)
Dept: URBAN - METROPOLITAN AREA CLINIC 109 | Facility: CLINIC | Age: 74
Setting detail: OPHTHALMOLOGY
End: 2024-07-22
Payer: MEDICARE

## 2024-07-22 DIAGNOSIS — H02.89: ICD-10-CM

## 2024-07-22 DIAGNOSIS — T15.11XA: ICD-10-CM

## 2024-07-22 DIAGNOSIS — T15.12XA: ICD-10-CM

## 2024-07-22 PROBLEM — H02.011 TRICHIASIS; RIGHT UPPER LID: Status: RESOLVED | Noted: 2024-07-01 | Resolved: 2024-07-22

## 2024-07-22 PROBLEM — H02.83 DERMATOCHALASIS; RIGHT UPPER LID, LEFT UPPER LID: Status: ACTIVE | Noted: 2024-07-01

## 2024-07-22 PROCEDURE — 99213 OFFICE O/P EST LOW 20 MIN: CPT | Performed by: OPHTHALMOLOGY

## 2024-07-22 ASSESSMENT — LID POSITION - DERMATOCHALASIS
OD_DERMATOCHALASIS: 1+
OS_DERMATOCHALASIS: 1+

## 2024-07-22 ASSESSMENT — CONFRONTATIONAL VISUAL FIELD TEST (CVF)
OD_FINDINGS: FULL
OS_FINDINGS: FULL

## 2024-09-17 ENCOUNTER — OFFICE (OUTPATIENT)
Dept: URBAN - METROPOLITAN AREA CLINIC 109 | Facility: CLINIC | Age: 74
Setting detail: OPHTHALMOLOGY
End: 2024-09-17
Payer: MEDICARE

## 2024-09-17 DIAGNOSIS — H02.89: ICD-10-CM

## 2024-09-17 PROCEDURE — 99213 OFFICE O/P EST LOW 20 MIN: CPT | Performed by: OPTOMETRIST

## 2024-09-17 ASSESSMENT — LID EXAM ASSESSMENTS
OS_BLEPHARITIS: LLL LUL 1+
OD_BLEPHARITIS: RLL RUL 1+

## 2024-09-17 ASSESSMENT — LID POSITION - DERMATOCHALASIS
OS_DERMATOCHALASIS: 1+
OD_DERMATOCHALASIS: 1+

## 2024-09-17 ASSESSMENT — CONFRONTATIONAL VISUAL FIELD TEST (CVF)
OD_FINDINGS: FULL
OS_FINDINGS: FULL

## 2024-09-26 ENCOUNTER — OFFICE (OUTPATIENT)
Dept: URBAN - METROPOLITAN AREA CLINIC 109 | Facility: CLINIC | Age: 74
Setting detail: OPHTHALMOLOGY
End: 2024-09-26
Payer: MEDICARE

## 2024-09-26 DIAGNOSIS — H10.45: ICD-10-CM

## 2024-09-26 DIAGNOSIS — H02.89: ICD-10-CM

## 2024-09-26 PROCEDURE — 99213 OFFICE O/P EST LOW 20 MIN: CPT | Performed by: OPHTHALMOLOGY

## 2024-09-26 ASSESSMENT — LID POSITION - DERMATOCHALASIS
OD_DERMATOCHALASIS: 1+
OS_DERMATOCHALASIS: 1+

## 2024-09-26 ASSESSMENT — CONFRONTATIONAL VISUAL FIELD TEST (CVF)
OD_FINDINGS: FULL
OS_FINDINGS: FULL

## 2024-09-26 ASSESSMENT — LID EXAM ASSESSMENTS
OD_BLEPHARITIS: RLL RUL 1+
OS_BLEPHARITIS: LLL LUL 1+

## 2024-10-10 ENCOUNTER — APPOINTMENT (OUTPATIENT)
Dept: ORTHOPEDIC SURGERY | Facility: CLINIC | Age: 74
End: 2024-10-10
Payer: MEDICARE

## 2024-10-10 VITALS — WEIGHT: 130 LBS | BODY MASS INDEX: 21.66 KG/M2 | HEIGHT: 65 IN

## 2024-10-10 DIAGNOSIS — M54.50 LOW BACK PAIN, UNSPECIFIED: ICD-10-CM

## 2024-10-10 DIAGNOSIS — M51.360: ICD-10-CM

## 2024-10-10 DIAGNOSIS — M50.320 OTHER CERVICAL DISC DEGENERATION, MID-CERVICAL REGION, UNSPECIFIED LEVEL: ICD-10-CM

## 2024-10-10 PROCEDURE — 73502 X-RAY EXAM HIP UNI 2-3 VIEWS: CPT

## 2024-10-10 PROCEDURE — 72100 X-RAY EXAM L-S SPINE 2/3 VWS: CPT

## 2024-10-10 PROCEDURE — 72040 X-RAY EXAM NECK SPINE 2-3 VW: CPT

## 2024-10-10 PROCEDURE — 99214 OFFICE O/P EST MOD 30 MIN: CPT

## 2024-11-11 ENCOUNTER — OFFICE (OUTPATIENT)
Dept: URBAN - METROPOLITAN AREA CLINIC 109 | Facility: CLINIC | Age: 74
Setting detail: OPHTHALMOLOGY
End: 2024-11-11
Payer: MEDICARE

## 2024-11-11 DIAGNOSIS — H02.831: ICD-10-CM

## 2024-11-11 DIAGNOSIS — H01.002: ICD-10-CM

## 2024-11-11 DIAGNOSIS — H43.393: ICD-10-CM

## 2024-11-11 DIAGNOSIS — H01.004: ICD-10-CM

## 2024-11-11 DIAGNOSIS — H25.13: ICD-10-CM

## 2024-11-11 DIAGNOSIS — H43.813: ICD-10-CM

## 2024-11-11 DIAGNOSIS — H02.89: ICD-10-CM

## 2024-11-11 DIAGNOSIS — H01.001: ICD-10-CM

## 2024-11-11 DIAGNOSIS — H02.834: ICD-10-CM

## 2024-11-11 DIAGNOSIS — H01.005: ICD-10-CM

## 2024-11-11 PROCEDURE — 92014 COMPRE OPH EXAM EST PT 1/>: CPT | Performed by: OPHTHALMOLOGY

## 2024-11-11 ASSESSMENT — REFRACTION_AUTOREFRACTION
OD_CYLINDER: -0.50
OD_AXIS: 107
OS_CYLINDER: -0.50
OD_SPHERE: -8.25
OS_AXIS: 179
OS_SPHERE: -7.50

## 2024-11-11 ASSESSMENT — REFRACTION_CURRENTRX
OD_AXIS: 024
OD_OVR_VA: 20/
OD_OVR_VA: 20/
OS_ADD: +2.75
OD_VPRISM_DIRECTION: PROGS
OS_CYLINDER: -1.25
OD_ADD: +2.75
OS_SPHERE: -7.25
OD_SPHERE: -8.00
OD_VPRISM_DIRECTION: PROGS
OD_CYLINDER: -0.75
OS_CYLINDER: -1.25
OD_OVR_VA: 20/
OS_SPHERE: -8.25
OD_VPRISM_DIRECTION: PROGS
OS_AXIS: 0
OS_OVR_VA: 20/
OS_AXIS: 166
OS_VPRISM_DIRECTION: PROGS
OS_ADD: +2.50
OD_SPHERE: -7.00
OS_VPRISM_DIRECTION: PROGS
OD_ADD: +2.50
OS_ADD: +2.75
OD_CYLINDER: -0.25
OS_AXIS: 170
OS_OVR_VA: 20/
OD_CYLINDER: -1.00
OS_SPHERE: -8.00
OD_AXIS: 012
OS_OVR_VA: 20/
OD_SPHERE: -8.00
OS_CYLINDER: -1.50
OD_AXIS: 10
OD_ADD: +2.75

## 2024-11-11 ASSESSMENT — TONOMETRY
OS_IOP_MMHG: 17
OD_IOP_MMHG: 17

## 2024-11-11 ASSESSMENT — REFRACTION_MANIFEST
OS_CYLINDER: -1.50
OD_AXIS: 15
OD_SPHERE: -6.75
OS_VA1: 20/20-2
OS_ADD: +2.75
OS_VA1: 20/20-
OD_SPHERE: -7.25
OD_ADD: +2.75
OS_AXIS: 170
OD_CYLINDER: -0.25
OD_VA1: 20/20-
OD_CYLINDER: -0.25
OD_AXIS: 015
OS_AXIS: 170
OD_VA1: 20/20-2
OS_CYLINDER: -1.50
OS_SPHERE: -6.75
OS_SPHERE: -6.75

## 2024-11-11 ASSESSMENT — LID POSITION - DERMATOCHALASIS
OD_DERMATOCHALASIS: 1+
OS_DERMATOCHALASIS: 1+

## 2024-11-11 ASSESSMENT — CONFRONTATIONAL VISUAL FIELD TEST (CVF)
OD_FINDINGS: FULL
OS_FINDINGS: FULL

## 2024-11-11 ASSESSMENT — LID EXAM ASSESSMENTS
OD_BLEPHARITIS: RLL RUL 1+
OS_BLEPHARITIS: LLL LUL 1+

## 2024-11-11 ASSESSMENT — KERATOMETRY
OD_K1POWER_DIOPTERS: 45.75
OS_K1POWER_DIOPTERS: 46.00
OD_K2POWER_DIOPTERS: 46.50
OD_AXISANGLE_DEGREES: 087
OS_K2POWER_DIOPTERS: 46.25
OS_AXISANGLE_DEGREES: 137

## 2024-11-11 ASSESSMENT — VISUAL ACUITY
OD_BCVA: 20/30-2
OS_BCVA: 20/20-2

## 2025-02-27 ENCOUNTER — OFFICE (OUTPATIENT)
Dept: URBAN - METROPOLITAN AREA CLINIC 109 | Facility: CLINIC | Age: 75
Setting detail: OPHTHALMOLOGY
End: 2025-02-27
Payer: MEDICARE

## 2025-02-27 ENCOUNTER — APPOINTMENT (OUTPATIENT)
Dept: ORTHOPEDIC SURGERY | Facility: CLINIC | Age: 75
End: 2025-02-27
Payer: MEDICARE

## 2025-02-27 VITALS — HEIGHT: 65 IN | WEIGHT: 132 LBS | BODY MASS INDEX: 21.99 KG/M2

## 2025-02-27 DIAGNOSIS — H01.005: ICD-10-CM

## 2025-02-27 DIAGNOSIS — M65.949 UNSPECIFIED SYNOVITIS AND TENOSYNOVITIS, UNSPECIFIED HAND: ICD-10-CM

## 2025-02-27 DIAGNOSIS — H01.004: ICD-10-CM

## 2025-02-27 DIAGNOSIS — M18.12 UNILATERAL PRIMARY OSTEOARTHRITIS OF FIRST CARPOMETACARPAL JOINT, LEFT HAND: ICD-10-CM

## 2025-02-27 DIAGNOSIS — H01.001: ICD-10-CM

## 2025-02-27 DIAGNOSIS — M19.049 PRIMARY OSTEOARTHRITIS, UNSPECIFIED HAND: ICD-10-CM

## 2025-02-27 DIAGNOSIS — M25.532 PAIN IN LEFT WRIST: ICD-10-CM

## 2025-02-27 DIAGNOSIS — H01.002: ICD-10-CM

## 2025-02-27 PROCEDURE — 99214 OFFICE O/P EST MOD 30 MIN: CPT

## 2025-02-27 PROCEDURE — 99213 OFFICE O/P EST LOW 20 MIN: CPT | Performed by: OPHTHALMOLOGY

## 2025-02-27 PROCEDURE — 73130 X-RAY EXAM OF HAND: CPT | Mod: LT

## 2025-02-27 ASSESSMENT — REFRACTION_MANIFEST
OD_SPHERE: -7.25
OD_CYLINDER: -0.25
OS_VA1: 20/20-
OD_SPHERE: -6.75
OS_CYLINDER: -1.50
OD_VA1: 20/20-
OS_VA1: 20/20-3
OS_AXIS: 170
OS_CYLINDER: -1.50
OD_ADD: +2.75
OS_AXIS: 170
OS_ADD: +2.75
OD_AXIS: 015
OS_SPHERE: -6.75
OS_SPHERE: -6.75
OD_CYLINDER: -0.25
OD_AXIS: 15
OD_VA1: 20/20-3

## 2025-02-27 ASSESSMENT — REFRACTION_CURRENTRX
OD_CYLINDER: -0.75
OS_OVR_VA: 20/
OS_VPRISM_DIRECTION: PROGS
OS_SPHERE: -7.25
OS_ADD: +2.75
OD_VPRISM_DIRECTION: PROGS
OD_AXIS: 10
OS_AXIS: 0
OD_OVR_VA: 20/
OD_AXIS: 012
OD_OVR_VA: 20/
OS_ADD: +2.75
OD_SPHERE: -7.00
OD_SPHERE: -8.00
OD_VPRISM_DIRECTION: PROGS
OS_ADD: +2.50
OS_OVR_VA: 20/
OD_ADD: +2.75
OD_AXIS: 024
OS_AXIS: 166
OD_SPHERE: -8.00
OS_CYLINDER: -1.25
OS_OVR_VA: 20/
OS_SPHERE: -8.25
OD_ADD: +2.75
OS_VPRISM_DIRECTION: PROGS
OD_ADD: +2.50
OS_AXIS: 170
OS_SPHERE: -8.00
OS_CYLINDER: -1.50
OD_OVR_VA: 20/
OD_VPRISM_DIRECTION: PROGS
OS_CYLINDER: -1.25
OD_CYLINDER: -0.25
OD_CYLINDER: -1.00

## 2025-02-27 ASSESSMENT — REFRACTION_AUTOREFRACTION
OS_CYLINDER: -0.50
OD_AXIS: 107
OS_AXIS: 179
OD_SPHERE: -8.25
OS_SPHERE: -7.50
OD_CYLINDER: -0.50

## 2025-02-27 ASSESSMENT — KERATOMETRY
OD_K1POWER_DIOPTERS: 45.75
OS_K1POWER_DIOPTERS: 46.00
OS_AXISANGLE_DEGREES: 137
OD_AXISANGLE_DEGREES: 087
OD_K2POWER_DIOPTERS: 46.50
OS_K2POWER_DIOPTERS: 46.25

## 2025-02-27 ASSESSMENT — TONOMETRY
OS_IOP_MMHG: 17
OD_IOP_MMHG: 17

## 2025-02-27 ASSESSMENT — VISUAL ACUITY
OD_BCVA: 20/30-3
OS_BCVA: 20/25-1

## 2025-02-27 ASSESSMENT — CONFRONTATIONAL VISUAL FIELD TEST (CVF)
OD_FINDINGS: FULL
OS_FINDINGS: FULL

## 2025-02-27 ASSESSMENT — LID POSITION - DERMATOCHALASIS
OD_DERMATOCHALASIS: 1+
OS_DERMATOCHALASIS: 1+

## 2025-02-27 ASSESSMENT — LID EXAM ASSESSMENTS
OS_BLEPHARITIS: LLL LUL 1+
OD_BLEPHARITIS: RLL RUL 1+

## 2025-05-03 ENCOUNTER — OFFICE (OUTPATIENT)
Dept: URBAN - METROPOLITAN AREA CLINIC 109 | Facility: CLINIC | Age: 75
Setting detail: OPHTHALMOLOGY
End: 2025-05-03
Payer: MEDICARE

## 2025-05-03 DIAGNOSIS — H01.001: ICD-10-CM

## 2025-05-03 DIAGNOSIS — H25.13: ICD-10-CM

## 2025-05-03 DIAGNOSIS — H43.393: ICD-10-CM

## 2025-05-03 DIAGNOSIS — H01.002: ICD-10-CM

## 2025-05-03 DIAGNOSIS — H01.005: ICD-10-CM

## 2025-05-03 DIAGNOSIS — H01.004: ICD-10-CM

## 2025-05-03 DIAGNOSIS — H43.813: ICD-10-CM

## 2025-05-03 PROCEDURE — 99213 OFFICE O/P EST LOW 20 MIN: CPT | Performed by: OPHTHALMOLOGY

## 2025-05-03 ASSESSMENT — REFRACTION_MANIFEST
OD_SPHERE: -7.25
OS_CYLINDER: -1.50
OD_ADD: +2.75
OD_SPHERE: -6.75
OS_VA1: 20/20-3
OS_SPHERE: -6.75
OD_AXIS: 15
OD_CYLINDER: -0.25
OS_SPHERE: -6.75
OS_VA1: 20/20-
OS_AXIS: 170
OD_VA1: 20/20-
OD_VA1: 20/20-3
OS_AXIS: 170
OD_CYLINDER: -0.25
OS_CYLINDER: -1.50
OS_ADD: +2.75
OD_AXIS: 015

## 2025-05-03 ASSESSMENT — REFRACTION_AUTOREFRACTION
OS_CYLINDER: -0.50
OS_AXIS: 001
OS_SPHERE: -7.50
OD_SPHERE: -7.50
OD_CYLINDER: -1.00
OD_AXIS: 117

## 2025-05-03 ASSESSMENT — REFRACTION_CURRENTRX
OD_AXIS: 16
OS_AXIS: 177
OS_VPRISM_DIRECTION: PROGS
OS_OVR_VA: 20/
OS_ADD: +2.50
OD_OVR_VA: 20/
OS_CYLINDER: -1.25
OD_VPRISM_DIRECTION: PROGS
OD_ADD: +2.50
OD_CYLINDER: -1.00
OS_SPHERE: -8.00
OD_SPHERE: -7.75

## 2025-05-03 ASSESSMENT — VISUAL ACUITY
OS_BCVA: 20/25-1
OD_BCVA: 20/30-2

## 2025-05-03 ASSESSMENT — TONOMETRY
OS_IOP_MMHG: 17
OD_IOP_MMHG: 16

## 2025-05-03 ASSESSMENT — KERATOMETRY
OS_AXISANGLE_DEGREES: 137
OD_AXISANGLE_DEGREES: 087
OS_K2POWER_DIOPTERS: 46.25
OD_K2POWER_DIOPTERS: 46.50
OD_K1POWER_DIOPTERS: 45.75
OS_K1POWER_DIOPTERS: 46.00

## 2025-05-03 ASSESSMENT — CONFRONTATIONAL VISUAL FIELD TEST (CVF)
OD_FINDINGS: FULL
OS_FINDINGS: FULL

## 2025-05-03 ASSESSMENT — LID POSITION - DERMATOCHALASIS
OD_DERMATOCHALASIS: 1+
OS_DERMATOCHALASIS: 1+

## 2025-05-03 ASSESSMENT — LID EXAM ASSESSMENTS
OS_BLEPHARITIS: LLL LUL 1+
OD_BLEPHARITIS: RLL RUL 1+

## 2025-08-13 ENCOUNTER — OFFICE (OUTPATIENT)
Dept: URBAN - METROPOLITAN AREA CLINIC 109 | Facility: CLINIC | Age: 75
Setting detail: OPHTHALMOLOGY
End: 2025-08-13
Payer: MEDICARE

## 2025-08-13 DIAGNOSIS — H02.403: ICD-10-CM

## 2025-08-13 DIAGNOSIS — H02.89: ICD-10-CM

## 2025-08-13 DIAGNOSIS — H01.005: ICD-10-CM

## 2025-08-13 DIAGNOSIS — H01.002: ICD-10-CM

## 2025-08-13 DIAGNOSIS — H02.831: ICD-10-CM

## 2025-08-13 DIAGNOSIS — H01.001: ICD-10-CM

## 2025-08-13 DIAGNOSIS — H01.004: ICD-10-CM

## 2025-08-13 DIAGNOSIS — H02.834: ICD-10-CM

## 2025-08-13 DIAGNOSIS — H25.13: ICD-10-CM

## 2025-08-13 PROCEDURE — 99213 OFFICE O/P EST LOW 20 MIN: CPT | Performed by: OPHTHALMOLOGY

## 2025-08-13 ASSESSMENT — LID EXAM ASSESSMENTS
OS_BLEPHARITIS: LLL LUL 1+
OD_BLEPHARITIS: RLL RUL 1+

## 2025-08-13 ASSESSMENT — REFRACTION_MANIFEST
OD_VA1: 20/20-
OS_AXIS: 170
OS_VA1: 20/20-
OS_CYLINDER: -1.50
OD_CYLINDER: -0.25
OD_AXIS: 15
OD_ADD: +2.75
OD_CYLINDER: -0.25
OS_ADD: +2.75
OS_SPHERE: -6.75
OD_SPHERE: -6.75
OS_VA1: 20/20-3
OD_AXIS: 015
OS_AXIS: 170
OD_SPHERE: -7.25
OS_CYLINDER: -1.50
OS_SPHERE: -6.75
OD_VA1: 20/20-3

## 2025-08-13 ASSESSMENT — REFRACTION_CURRENTRX
OD_SPHERE: -7.75
OS_CYLINDER: -1.25
OD_AXIS: 16
OS_ADD: +2.50
OS_SPHERE: -8.00
OS_VPRISM_DIRECTION: PROGS
OS_OVR_VA: 20/
OD_ADD: +2.50
OS_AXIS: 177
OD_CYLINDER: -1.00
OD_VPRISM_DIRECTION: PROGS
OD_OVR_VA: 20/

## 2025-08-13 ASSESSMENT — REFRACTION_AUTOREFRACTION
OD_SPHERE: -7.50
OS_AXIS: 001
OS_SPHERE: -7.50
OD_CYLINDER: -1.00
OD_AXIS: 117
OS_CYLINDER: -0.50

## 2025-08-13 ASSESSMENT — CONFRONTATIONAL VISUAL FIELD TEST (CVF)
OS_FINDINGS: FULL
OD_FINDINGS: FULL

## 2025-08-13 ASSESSMENT — KERATOMETRY
OS_K2POWER_DIOPTERS: 46.25
OS_K1POWER_DIOPTERS: 46.00
OD_AXISANGLE_DEGREES: 087
OD_K2POWER_DIOPTERS: 46.50
OS_AXISANGLE_DEGREES: 137
OD_K1POWER_DIOPTERS: 45.75

## 2025-08-13 ASSESSMENT — VISUAL ACUITY
OS_BCVA: 20/30
OD_BCVA: 20/30-2

## 2025-08-13 ASSESSMENT — LID POSITION - PTOSIS
OD_PTOSIS: RUL 1+
OS_PTOSIS: LUL 2+

## 2025-08-13 ASSESSMENT — LID POSITION - DERMATOCHALASIS
OS_DERMATOCHALASIS: 1+
OD_DERMATOCHALASIS: 1+

## 2025-08-22 ENCOUNTER — OFFICE (OUTPATIENT)
Facility: LOCATION | Age: 75
Setting detail: OPHTHALMOLOGY
End: 2025-08-22
Payer: MEDICARE

## 2025-08-22 DIAGNOSIS — H02.89: ICD-10-CM

## 2025-08-22 DIAGNOSIS — H02.831: ICD-10-CM

## 2025-08-22 DIAGNOSIS — H02.403: ICD-10-CM

## 2025-08-22 DIAGNOSIS — H01.004: ICD-10-CM

## 2025-08-22 DIAGNOSIS — H01.001: ICD-10-CM

## 2025-08-22 DIAGNOSIS — H01.005: ICD-10-CM

## 2025-08-22 DIAGNOSIS — H01.002: ICD-10-CM

## 2025-08-22 DIAGNOSIS — H25.13: ICD-10-CM

## 2025-08-22 DIAGNOSIS — H57.12: ICD-10-CM

## 2025-08-22 DIAGNOSIS — H02.834: ICD-10-CM

## 2025-08-22 PROCEDURE — 99213 OFFICE O/P EST LOW 20 MIN: CPT | Performed by: OPHTHALMOLOGY

## 2025-08-22 ASSESSMENT — LID EXAM ASSESSMENTS
OS_BLEPHARITIS: LLL LUL 1+
OD_BLEPHARITIS: RLL RUL 1+

## 2025-08-22 ASSESSMENT — REFRACTION_CURRENTRX
OS_CYLINDER: -1.25
OS_VPRISM_DIRECTION: PROGS
OD_SPHERE: -7.75
OD_OVR_VA: 20/
OS_ADD: +2.50
OS_OVR_VA: 20/
OD_AXIS: 16
OD_VPRISM_DIRECTION: PROGS
OS_SPHERE: -8.00
OD_ADD: +2.50
OS_AXIS: 177
OD_CYLINDER: -1.00

## 2025-08-22 ASSESSMENT — REFRACTION_MANIFEST
OS_VA1: 20/20-
OS_AXIS: 170
OS_VA1: 20/20-3
OD_AXIS: 15
OD_AXIS: 015
OS_SPHERE: -6.75
OS_SPHERE: -6.75
OD_CYLINDER: -0.25
OS_CYLINDER: -1.50
OD_SPHERE: -6.75
OS_ADD: +2.75
OD_CYLINDER: -0.25
OD_ADD: +2.75
OD_VA1: 20/20-3
OD_VA1: 20/20-
OD_SPHERE: -7.25
OS_AXIS: 170
OS_CYLINDER: -1.50

## 2025-08-22 ASSESSMENT — VISUAL ACUITY
OD_BCVA: 20/40-2
OS_BCVA: 20/25+2

## 2025-08-22 ASSESSMENT — REFRACTION_AUTOREFRACTION
OD_SPHERE: -7.50
OS_SPHERE: -7.50
OD_AXIS: 117
OD_CYLINDER: -1.00
OS_AXIS: 001
OS_CYLINDER: -0.50

## 2025-08-22 ASSESSMENT — LID POSITION - PTOSIS
OD_PTOSIS: RUL 1+
OS_PTOSIS: LUL 2+

## 2025-08-22 ASSESSMENT — KERATOMETRY
OS_K2POWER_DIOPTERS: 46.25
OD_AXISANGLE_DEGREES: 087
OS_AXISANGLE_DEGREES: 137
OD_K1POWER_DIOPTERS: 45.75
OS_K1POWER_DIOPTERS: 46.00
OD_K2POWER_DIOPTERS: 46.50

## 2025-08-22 ASSESSMENT — LID POSITION - DERMATOCHALASIS
OS_DERMATOCHALASIS: 1+
OD_DERMATOCHALASIS: 1+

## 2025-08-22 ASSESSMENT — CONFRONTATIONAL VISUAL FIELD TEST (CVF)
OS_FINDINGS: FULL
OD_FINDINGS: FULL